# Patient Record
Sex: FEMALE | Race: WHITE | Employment: OTHER | URBAN - METROPOLITAN AREA
[De-identification: names, ages, dates, MRNs, and addresses within clinical notes are randomized per-mention and may not be internally consistent; named-entity substitution may affect disease eponyms.]

---

## 2017-01-17 PROBLEM — E78.5 DYSLIPIDEMIA (HIGH LDL; LOW HDL): Chronic | Status: ACTIVE | Noted: 2017-01-17

## 2017-01-17 PROBLEM — E55.9 HYPOVITAMINOSIS D: Chronic | Status: ACTIVE | Noted: 2017-01-17

## 2017-04-06 PROBLEM — E03.9 PRIMARY HYPOTHYROIDISM: Status: ACTIVE | Noted: 2017-04-06

## 2017-04-06 PROBLEM — E06.3 HASHIMOTO'S THYROIDITIS: Status: ACTIVE | Noted: 2017-04-06

## 2017-05-11 PROBLEM — Z79.890 HORMONE REPLACEMENT THERAPY (HRT): Status: ACTIVE | Noted: 2017-05-11

## 2017-05-11 PROBLEM — R53.82 CHRONIC FATIGUE: Chronic | Status: ACTIVE | Noted: 2017-05-11

## 2017-05-11 PROBLEM — E06.3 HYPOTHYROIDISM DUE TO HASHIMOTO'S THYROIDITIS: Chronic | Status: ACTIVE | Noted: 2017-05-11

## 2017-05-11 PROBLEM — E03.8 HYPOTHYROIDISM DUE TO HASHIMOTO'S THYROIDITIS: Chronic | Status: ACTIVE | Noted: 2017-05-11

## 2017-05-11 PROBLEM — E66.3 OVERWEIGHT (BMI 25.0-29.9): Chronic | Status: ACTIVE | Noted: 2017-05-11

## 2017-05-11 PROBLEM — R40.0 SOMNOLENCE, DAYTIME: Chronic | Status: ACTIVE | Noted: 2017-05-11

## 2017-11-08 ENCOUNTER — HOSPITAL ENCOUNTER (OUTPATIENT)
Dept: MAMMOGRAPHY | Age: 62
Discharge: HOME | End: 2017-11-08
Attending: OBSTETRICS & GYNECOLOGY
Payer: COMMERCIAL

## 2017-11-08 DIAGNOSIS — Z12.31 VISIT FOR SCREENING MAMMOGRAM: ICD-10-CM

## 2017-11-08 PROCEDURE — 77067 SCR MAMMO BI INCL CAD: CPT

## 2017-12-11 ENCOUNTER — HOSPITAL ENCOUNTER (OUTPATIENT)
Dept: MAMMOGRAPHY | Age: 62
Discharge: HOME | End: 2017-12-11
Attending: OBSTETRICS & GYNECOLOGY
Payer: COMMERCIAL

## 2017-12-11 DIAGNOSIS — Z82.62 FAM HX-OSTEOPOROSIS: ICD-10-CM

## 2017-12-11 PROCEDURE — 77080 DXA BONE DENSITY AXIAL: CPT

## 2018-04-18 PROBLEM — F32.A MILD DEPRESSION: Status: ACTIVE | Noted: 2018-04-18

## 2018-04-18 PROBLEM — F32.A MILD DEPRESSION: Chronic | Status: ACTIVE | Noted: 2018-04-18

## 2018-05-03 ENCOUNTER — HOSPITAL ENCOUNTER (OUTPATIENT)
Dept: MRI IMAGING | Age: 63
Discharge: HOME | End: 2018-05-03
Attending: INTERNAL MEDICINE
Payer: COMMERCIAL

## 2018-05-03 DIAGNOSIS — R42 VERTIGO: ICD-10-CM

## 2018-05-03 DIAGNOSIS — G89.29 CHRONIC NONINTRACTABLE HEADACHE, UNSPECIFIED HEADACHE TYPE: ICD-10-CM

## 2018-05-03 DIAGNOSIS — R51.9 CHRONIC NONINTRACTABLE HEADACHE, UNSPECIFIED HEADACHE TYPE: ICD-10-CM

## 2018-05-03 PROCEDURE — 70553 MRI BRAIN STEM W/O & W/DYE: CPT

## 2018-05-03 PROCEDURE — 74011250636 HC RX REV CODE- 250/636: Performed by: INTERNAL MEDICINE

## 2018-05-03 PROCEDURE — A9577 INJ MULTIHANCE: HCPCS | Performed by: INTERNAL MEDICINE

## 2018-05-03 RX ORDER — SODIUM CHLORIDE 0.9 % (FLUSH) 0.9 %
10 SYRINGE (ML) INJECTION
Status: COMPLETED | OUTPATIENT
Start: 2018-05-03 | End: 2018-05-03

## 2018-05-03 RX ADMIN — Medication 10 ML: at 19:03

## 2018-05-03 RX ADMIN — GADOBENATE DIMEGLUMINE 12 ML: 529 INJECTION, SOLUTION INTRAVENOUS at 19:03

## 2018-12-05 ENCOUNTER — HOSPITAL ENCOUNTER (OUTPATIENT)
Dept: MAMMOGRAPHY | Age: 63
Discharge: HOME | End: 2018-12-05
Attending: OBSTETRICS & GYNECOLOGY
Payer: COMMERCIAL

## 2018-12-05 DIAGNOSIS — Z12.39 SCREENING FOR BREAST CANCER: ICD-10-CM

## 2018-12-05 PROCEDURE — 77067 SCR MAMMO BI INCL CAD: CPT

## 2018-12-21 ENCOUNTER — HOSPITAL ENCOUNTER (EMERGENCY)
Age: 63
Discharge: HOME | End: 2018-12-22
Payer: COMMERCIAL

## 2018-12-21 ENCOUNTER — APPOINTMENT (OUTPATIENT)
Dept: GENERAL RADIOLOGY | Age: 63
End: 2018-12-21
Attending: EMERGENCY MEDICINE
Payer: COMMERCIAL

## 2018-12-21 DIAGNOSIS — S82.831A CLOSED FRACTURE OF DISTAL END OF RIGHT FIBULA, UNSPECIFIED FRACTURE MORPHOLOGY, INITIAL ENCOUNTER: Primary | ICD-10-CM

## 2018-12-21 PROCEDURE — 73610 X-RAY EXAM OF ANKLE: CPT

## 2018-12-21 PROCEDURE — 99283 EMERGENCY DEPT VISIT LOW MDM: CPT

## 2018-12-21 RX ORDER — HYDROCODONE BITARTRATE AND ACETAMINOPHEN 5; 325 MG/1; MG/1
1 TABLET ORAL
Qty: 10 TAB | Refills: 0 | Status: SHIPPED | OUTPATIENT
Start: 2018-12-21 | End: 2019-03-11

## 2018-12-22 VITALS
RESPIRATION RATE: 16 BRPM | SYSTOLIC BLOOD PRESSURE: 127 MMHG | TEMPERATURE: 98.2 F | OXYGEN SATURATION: 99 % | HEART RATE: 63 BPM | DIASTOLIC BLOOD PRESSURE: 79 MMHG

## 2018-12-22 NOTE — ED NOTES
I have reviewed discharge instructions with the patient. The patient verbalized understanding. Patient left ED via Discharge Method: wheelchair to Home with . Opportunity for questions and clarification provided. Patient given 0 scripts. To continue your aftercare when you leave the hospital, you may receive an automated call from our care team to check in on how you are doing. This is a free service and part of our promise to provide the best care and service to meet your aftercare needs.  If you have questions, or wish to unsubscribe from this service please call [phone]. Thank you for Choosing our [facility] Emergency Department.

## 2018-12-22 NOTE — ED PROVIDER NOTES
18-year-old female complaining of right ankle pain. This was at [facility] her foot tangled up in a cord and         Fall   Pertinent negatives include no visual change, no numbness, no abdominal pain, no loss of consciousness and no laceration. The symptoms are aggravated by activity. She has tried nothing for the symptoms. The patient's last tetanus shot was less than 5 years ago. Ankle Injury    Pertinent negatives include no numbness.         Past Medical History:   Diagnosis Date    Anxiety     Atypical facial pain     localized to rt maxill    Chronic sinusitis     Depression     Deviated nasal septum     Esophageal reflux 7/18/2016    Eustachian tube dysfunction     GERD (gastroesophageal reflux disease)     History of blood transfusion 1992    History of mycoplasma pneumonia 1998    Hx of chlamydia infection     treated 1994    Hx of Lyme disease 2002    Hypothyroidism due to Hashimoto's thyroiditis 5/11/2017    Migraine     Mild depression ([facility] [address] 4/18/2018    Nausea & vomiting     Other ill-defined conditions(799.89)     h/o osteomylitis of [facility]- still having problems    Thyroid disease 2004    Hashimoto's    Vaginitis and vulvovaginitis        Past Surgical History:   Procedure Laterality Date    BREAST SURGERY PROCEDURE UNLISTED  [id number]    left breast x3    BREAST SURGERY PROCEDURE UNLISTED  1992    reconstruction    HX BREAST BIOPSY Left 1992, 1999, 2002    benign mass    HX BREAST RECONSTRUCTION Bilateral     HX BREAST REDUCTION Bilateral 1992    HX COLONOSCOPY  2013    HX GYN  1981    Tubal Ligation    HX HEENT  1964    tonsilectomy    HX HEENT      jaw surg r/t osteomylitits-- 2004    HX HEENT  4/13/05    SEPTO/FESS    HX OTHER SURGICAL Bilateral 06/7/05    Tube Placement    HX REFRACTIVE SURGERY  2001    HX NANCY AND BSO  1999    Hysterectomy & BSO         Family History:   Problem Relation Age of Onset    Post-op Nausea/Vomiting Mother    [facility] Other Mother  Hypertension Mother    [facility] Diabetes Father     Hypertension Father     Other Father         bladder cancer, parkinson's    Heart Disease Father     Cancer Father         bladder    Parkinson's Disease Father     Glaucoma Father    [facility] Stroke Sister     Hypertension Sister     Stroke Paternal Grandfather     No Known Problems Brother     Hypertension Daughter     Colon Cancer Maternal Aunt     Diabetes Maternal Uncle     Breast Cancer Maternal Grandmother     Hypertension Paternal Grandmother     Breast Cancer Paternal Aunt     Malignant Hyperthermia Neg Hx     Pseudocholinesterase Deficiency Neg Hx     Delayed Awakening Neg Hx     Emergence Delirium Neg Hx     Post-op Cognitive Dysfunction Neg Hx        Social History     Socioeconomic History    Marital status:      Spouse name: Not on file    Number of children: Not on file    Years of education: Not on file    Highest education level: Not on file   Social Needs    Financial resource strain: Not on file    Food insecurity - worry: Not on file    Food insecurity - inability: Not on file   [employer] needs - medical: Not on file   [employer] needs - non-medical: Not on file   Occupational History    Not on file   Tobacco Use    Smoking status: Never Smoker    Smokeless tobacco: Never Used   Substance and Sexual Activity    Alcohol use: No    Drug use: No    Sexual activity: Yes     Partners: Male     Birth control/protection: Surgical   Other Topics Concern    Not on file   Social History Narrative    Not on file         ALLERGIES: Latex; Adhesive tape; Lortab [hydrocodone-acetaminophen]; Minocycline; and Other plant, animal, environmental    Review of Systems   Constitutional: Negative. Negative for activity change. HENT: Negative. Eyes: Negative. Respiratory: Negative. Cardiovascular: Negative. Gastrointestinal: Negative. Negative for abdominal pain. Genitourinary: Negative. Musculoskeletal: Negative. Skin: Negative. Neurological: Negative. Negative for loss of consciousness and numbness. Psychiatric/Behavioral: Negative. All other systems reviewed and are negative. Vitals:    12/21/18 2056   BP: 140/74   Pulse: (!) 59   Resp: 16   Temp: 98 °F (36.7 °C)   SpO2: 99%            Physical Exam   Constitutional: She is oriented to person, place, and time. She appears well-developed and well-nourished. No distress. HENT:   Head: Normocephalic and atraumatic. Right Ear: External ear normal.   Left Ear: External ear normal.   Nose: Nose normal.   Eyes: Conjunctivae and EOM are normal. Pupils are equal, round, and reactive to light. Right eye exhibits no discharge. Left eye exhibits no discharge. No scleral icterus. Neck: Normal range of motion. Cardiovascular: Regular rhythm. Pulmonary/Chest: Effort normal and breath sounds normal. No stridor. No respiratory distress. She has no wheezes. She has no rales. Abdominal: Soft. Bowel sounds are normal. She exhibits no distension. There is no tenderness. Musculoskeletal: Normal range of motion. Right ankle: She exhibits swelling. Tenderness. Neurological: She is alert and oriented to person, place, and time. She exhibits normal muscle tone. Coordination normal.   Skin: Skin is warm and dry. No laceration and no rash noted. Psychiatric: She has a normal mood and affect.  Her behavior is normal.        MDM  Number of Diagnoses or Management Options  Diagnosis management comments: Assessment nondisplaced right distal fibular fracture       Amount and/or Complexity of Data Reviewed  Tests in the radiology section of CPT®: ordered and reviewed           Procedures

## 2018-12-22 NOTE — DISCHARGE INSTRUCTIONS
Broken Ankle: Care Instructions  Your Care Instructions    An ankle may break (fracture) during sports, a fall, or other accidents. Fractures can range from a small, hairline crack, to a bone or bones broken into two or more pieces. Your treatment depends on how bad the break is. Your doctor may have put your ankle in a splint or cast to allow it to heal or to keep it stable until you see another doctor. It may take weeks or months for your ankle to heal. You can help your ankle heal with some care at home. You heal best when you take good care of yourself. Eat a variety of healthy foods, and don't smoke. You may have had a sedative to help you relax. You may be unsteady after having sedation. It can take a few hours for the medicine's effects to wear off. Common side effects of sedation include nausea, vomiting, and feeling sleepy or tired. The doctor has checked you carefully, but problems can develop later. If you notice any problems or new symptoms,  get medical treatment right away. Follow-up care is a key part of your treatment and safety. Be sure to make and go to all appointments, and call your doctor if you are having problems. It's also a good idea to know your test results and keep a list of the medicines you take. How can you care for yourself at home? · If the doctor gave you a sedative:  ? For 24 hours, don't do anything that requires attention to detail. It takes time for the medicine's effects to completely wear off.  ? For your safety, do not drive or operate any machinery that could be dangerous. Wait until the medicine wears off and you can think clearly and react easily. · Put ice or a cold pack on your ankle for 10 to 20 minutes at a time. Try to do this every 1 to 2 hours for the next 3 days (when you are awake). Put a thin cloth between the ice and your cast or splint. Keep your cast or splint dry. · Follow the cast care instructions your doctor gives you.  If you have a splint, do not take it off unless your doctor tells you to. · Be safe with medicines. Take pain medicines exactly as directed. ? If the doctor gave you a prescription medicine for pain, take it as prescribed. ? If you are not taking a prescription pain medicine, ask your doctor if you can take an over-the-counter medicine. · Prop up your leg on pillows in the first few days after the injury. Keep the ankle higher than the level of your heart. This will help reduce swelling. · Do not put weight on your ankle unless your doctor tells you to. Use crutches to walk. · Follow instructions for exercises to keep your leg strong. · Wiggle your toes often to reduce swelling and stiffness. When should you call for help? Call 911 anytime you think you may need emergency care. For example, call if:    · You have chest pain, are short of breath, or you cough up blood.     · You are very sleepy and you have trouble waking up.    Call your doctor now or seek immediate medical care if:    · You have new or worse nausea or vomiting.     · You have new or worse pain.     · Your foot is cool or pale or changes color.     · You have tingling, weakness, or numbness in your toes.     · Your cast or splint feels too tight.     · You have signs of a blood clot in your leg (called a deep vein thrombosis), such as:  ? Pain in your calf, back of the knee, thigh, or groin. ? Redness or swelling in your leg.    Watch closely for changes in your health, and be sure to contact your doctor if:    · You have a problem with your splint or cast.     · You do not get better as expected. Where can you learn more? Go to http://dm-viri.info/. Enter P763 in the search box to learn more about \"Broken Ankle: Care Instructions. \"  Current as of: November 29, 2017  Content Version: 11.8  © 3634-2880 [employer].  Care instructions adapted under license by [employer] (which disclaims liability or warranty for this information). If you have questions about a medical condition or this instruction, always ask your healthcare professional. [place] [address] any warranty or liability for your use of this information.

## 2020-01-20 ENCOUNTER — HOSPITAL ENCOUNTER (OUTPATIENT)
Dept: MAMMOGRAPHY | Age: 65
Discharge: HOME | End: 2020-01-20
Attending: OBSTETRICS & GYNECOLOGY
Payer: MEDICARE

## 2020-01-20 DIAGNOSIS — Z12.31 VISIT FOR SCREENING MAMMOGRAM: ICD-10-CM

## 2020-01-20 PROCEDURE — 77067 SCR MAMMO BI INCL CAD: CPT

## 2020-07-15 ENCOUNTER — HOSPITAL ENCOUNTER (OUTPATIENT)
Dept: MAMMOGRAPHY | Age: 65
Discharge: HOME | End: 2020-07-15
Payer: MEDICARE

## 2020-07-15 DIAGNOSIS — M85.89 OSTEOPENIA OF MULTIPLE SITES: ICD-10-CM

## 2020-07-15 PROCEDURE — 77080 DXA BONE DENSITY AXIAL: CPT

## 2021-01-22 ENCOUNTER — HOSPITAL ENCOUNTER (OUTPATIENT)
Dept: MAMMOGRAPHY | Age: 66
Discharge: HOME | End: 2021-01-22
Attending: OBSTETRICS & GYNECOLOGY
Payer: MEDICARE

## 2021-01-22 DIAGNOSIS — N64.4 BREAST PAIN, LEFT: ICD-10-CM

## 2021-01-22 PROCEDURE — 77066 DX MAMMO INCL CAD BI: CPT

## 2021-02-11 PROBLEM — N39.3 STRESS INCONTINENCE: Status: ACTIVE | Noted: 2021-02-11

## 2021-02-11 PROBLEM — N39.41 URGE INCONTINENCE: Status: ACTIVE | Noted: 2021-02-11

## 2021-02-23 ENCOUNTER — HOSPITAL ENCOUNTER (OUTPATIENT)
Dept: SURGERY | Age: 66
Discharge: HOME | End: 2021-02-23

## 2021-02-26 VITALS — BODY MASS INDEX: 25.87 KG/M2 | WEIGHT: 146 LBS | HEIGHT: 63 IN

## 2021-02-26 NOTE — H&P
History and Physical    Patient: Angi Capellan MRN: [id number]  SSN: [id number]    YOB: 1955  Age: 76 y.o. Sex: female        Chief Complaint:  Stress Incontinence  History of Present Illness:  Angi Capellan is a 76 y.o. female PAPITO. Ms. Angi Capellan has been experiencing prolapse for 1 year. The prolapse does cause her pressure. She does not have to splint to complete urination, a BM, or for comfort. Nothing makes her prolapse symptoms worse. Nothing makes her prolapse symptoms better. She has not tried a pessary, she has tried physical therapy, she has not  had surgery for her POP. Ms. Angi Capellan has been leaking urine for 1 year. She leaks urine both daytime and night time. She does leak urine with cough, laugh, sneeze and activity. She does leak urine with urgency. She uses medium pads and goes through 3 or more per day. She changes for both wetness and hygiene. She leaks numerous times per day. When she leaks she leaks both small and large amounts. She has  tried PT, she has not  tried medication. She has not  had procedures/surgery for this in the past.   She does not feel like she completely empties. PAPITO>UUI       She voids 10-12 times during the day. She voids 2 times over night. She has 7-14 BM per week, and does strain sometimes. She drinks 2 caffeine drinks beverages per day. Coffee  She uses 0 artificial sweeteners per day. She drinks 0 alcoholic beverages per week. She has  pelvic surgery in the past. [address] and BSO 2000  Her last PAP: 2020  No results found for: [id number]  Her last Colonoscopy: 11/17/17  Her last Mammogram: 1/12/21    She does not have a history of DM. No results found for: HBA1C, MGE0PMMY, SBO2PDBT  She does not have a history of sleep apnea. Tobacco: No    Sexual History: has sex with males. Her .     Allergies:  Latex  Adhesive Tape  Lortab [Hydrocodone-Acetaminophen]  Minocycline  Other Plant, Animal, Environmental    Medications:  No current facility-administered medications for this encounter. Current Outpatient Medications:     fluticasone propionate (FLONASE) 50 mcg/actuation nasal spray, 2 Sprays by Both Nostrils route daily. , Disp: 1 Bottle, Rfl: 3    LORazepam (ATIVAN) 0.5 mg tablet, Take 1 Tab by mouth every six (6) hours as needed for Anxiety. Max Daily Amount: 2 mg. Do not drive while on this medication. Indications: anxious, Disp: 60 Tab, Rfl: 1    levothyroxine (SYNTHROID) 50 mcg tablet, Take 1 Tab by mouth Daily (before breakfast). Indications: a condition with low thyroid hormone levels, Disp: 90 Tab, Rfl: 3    escitalopram oxalate (LEXAPRO) 20 mg tablet, Take 1 Tab by mouth nightly. Indications: anxiousness associated with depression, Disp: 90 Tab, Rfl: 3    betamethasone valerate (VALISONE) 0.1 % topical lotion, Apply  to affected area daily. , Disp: 60 mL, Rfl: 3    Cholecalciferol, Vitamin D3, 50,000 unit cap, Take 1 Cap by mouth every seven (7) days. , Disp: , Rfl:     MULTIVITAMINS W-MINERALS (MULTI-VIT 55 PLUS PO), Take 1 Tab by mouth every morning.  Stopped 3/3/10, Disp: , Rfl:       Past Medical History:  Past Medical History:   Diagnosis Date    Anxiety     Atypical facial pain     localized to rt maxill    Chronic sinusitis     Depression     Deviated nasal septum     Esophageal reflux 7/18/2016    Eustachian tube dysfunction     GERD (gastroesophageal reflux disease)     History of blood transfusion 1992    History of mycoplasma pneumonia 1998    History of osteomyelitis     h/o osteomylitis of [id number]- still having problems    History of shingles 2020    Hx of chlamydia infection     treated 1994    Hx of Lyme disease 2002    Hypothyroidism due to Hashimoto's thyroiditis 5/11/2017    Migraine     Mild depression ([address] 4/18/2018    Nausea & vomiting     Thyroid disease 2004    Hashimoto's    Vaginitis and vulvovaginitis        Past Surgical History:  Past Surgical History:   Procedure Laterality Date   • HX BREAST BIOPSY Left 1992, 1999, 2002    benign mass   • HX BREAST RECONSTRUCTION Bilateral    • HX BREAST REDUCTION Bilateral 1992   • HX COLONOSCOPY  2013   • HX GYN  1981    Tubal Ligation   • HX HEENT  1964    tonsilectomy   • HX HEENT      jaw surg r/t osteomylitits-- 2004   • HX HEENT  4/13/05    SEPTO/FESS   • HX OTHER SURGICAL Bilateral 06/7/05    Tube Placement   • HX REFRACTIVE SURGERY  2001   • HX NANCY AND BSO  1999    Hysterectomy & BSO   • [place] BREAST SURGERY PROCEDURE UNLISTED  1992,1999,2002    left breast x3   • [place] BREAST SURGERY PROCEDURE UNLISTED  1992    reconstruction       Family History:  Family History   Problem Relation Age of Onset   • Post-op Nausea/Vomiting Mother    • Other Mother    • Hypertension Mother    • Diabetes Father    • Hypertension Father    • Other Father         bladder cancer, parkinson's   • Heart Disease Father    • Cancer Father         bladder   • Parkinson's Disease Father    • Glaucoma Father    • Stroke Sister    • Hypertension Sister    • Stroke Paternal Grandfather    • No Known Problems Brother    • Hypertension Daughter    • Colon Cancer Maternal Aunt    • Diabetes Maternal Uncle    • Breast Cancer Maternal Grandmother    • Hypertension Paternal Grandmother    • Breast Cancer Paternal Aunt    • Malignant Hyperthermia Neg Hx    • Pseudocholinesterase Deficiency Neg Hx    • Delayed Awakening Neg Hx    • Emergence Delirium Neg Hx    • Post-op Cognitive Dysfunction Neg Hx          Social History:  Social History     Tobacco Use   Smoking Status Never Smoker   Smokeless Tobacco Never Used     Social History     Substance and Sexual Activity   Alcohol Use No     Social History     Substance and Sexual Activity   Drug Use No     Physical Exam:  PVR by straight catheterization: 60cc    Physical Exam    Female Genitourinary   Vulva:    Normal. No lesions  Bartholin's Gland:  Bilateral , Normal, nontender  Skenes Gland:  Bilateral, Normal, nontender   Clitoris:  Normal.   Introitus:    Normal.   Urethral Meatus:  Normal appearing, normal size, no lesions, no prolapse  Urethra:  No masses, no tenderness  Vagina:  No atrophy, no discharge, no lesions  Cervix:  Absent  Uterus:  Absent   Adnexa:   No masses palpated, no tenderness  Bladder:  No tenderness, no masses palpated  Perineum:  Normal, no lesions    Rectal   Anorectal Exam: No hemorrhoids and no masses or lesions of the perineum        POP-Q: (Pelvic Organ Prolapse - Quantification Exam):    -3 Aa -3 Ba -9.5 C   2 gh 4 pb 10 tvl   -3 Ap -3 Bp X D     ICS Stage: 0      Pelvic floor muscles: Tender Spasm     R. Puborectalis: NO 0 /5    L. Puborectalis: NO 0 /5    R. Pubococcyg NO 0 /5    L. Pubococcyg NO 0 /5    R. Ileococcyg: NO 0 /5    L. Ileococcyg: NO 0 /5    R. Obturator Int: NO 0 /5    L. Obturator Int: NO 0 /5    R. Coccygeus: NO 0 /5    L. Coccygeus: NO 0 /5      Pelvic floor contractions: 0/5    Supine Stress Test of PAPITO: Negative    Neurological Exam:   Sensorineural Exam:    Bulvocavernosus reflex:  Normal   Anal [place]:  Normal        Assessment and Plan:  Stress incontinence  Assessment & Plan:  We discussed the differential diagnosis of urinary incontinence. We also discussed the pathogenesis and etiology of stress urinary incontinence. I explained the epidemiology of incontinence. I offered her options which include nothing, physical therapy, barrier treatment, and surgery. She is going to decrease her coffee    Stress Incontinence  We discussed the differential diagnosis of urinary incontinence. We also discussed the pathogenesis and etiology of stress urinary incontinence. I explained the epidemiology of incontinence. I offered her options which include nothing, physical therapy, barrier treatment, and surgery. She is interested in surgical treatment. I described the surgical technique to be employed for her upcoming surgery.  We discussed the anticipated postoperative course. TVT cure rates at 6 years is up to 85%. We discussed the properties of polypropylene mesh. We discussed the inert nature and the potential for complication, including infections, rejection, and erosion. The risk of erosion is <10%. We discussed the FDA warning on mesh use. Risks, benefits, indications, and alternatives of the surgery were discussed. Risks reviewed include bleeding, infections, injury to pelvic organs (bladder, nerves, vessels, urethra, and ureters), recurrence, urinary retention, dyspareunia, anesthetic complications, and death. We discussed that other complications could arise and that the list is too long to discuss comprehensively.       She is consented for a midurethral sling and cystoscopy        Signed By: London Loving DO     February 26, 2021

## 2021-02-26 NOTE — PERIOP NOTES
Patient verified name and . Order for consent  found in EHR and matches case posting; patient verifies procedure. Type 1b surgery, phone assessment complete. Orders  received. Labs per surgeon: none  Labs per anesthesia protocol: none    Patient COVID test date 21; Patient did  show for the appointment. [facility] [address] [place]. If appointment is needed-patient provided telephone number of [phone]. Patient answered medical/surgical history questions at their best of ability. All prior to admission medications documented in [facility]. Patient instructed to take the following medications the day of surgery according to anesthesia guidelines with a small sip of water:Levothyroxine . Hold all vitamins 7 days prior to surgery and NSAIDS 5 days prior to surgery. Prescription meds to hold: none    Patient instructed on the following:      > 1 medical  is allowed at this time. > Arrive at [facility], time of arrival to be called the day before by 1700  > NPO after midnight including gum, mints, and ice chips  > Responsible adult must drive patient to the hospital, stay during surgery, and patient will need supervision 24 hours after anesthesia  > Use antibacterial soap in shower the night before surgery and on the morning of surgery  > All piercings must be removed prior to arrival.    > Leave all valuables (money and jewelry) at home but bring insurance card and ID on DOS.   > Do not wear make-up, nail polish, lotions, cologne, perfumes, powders, or oil on skin.

## 2021-03-01 ENCOUNTER — ANESTHESIA EVENT (OUTPATIENT)
Dept: SURGERY | Age: 66
End: 2021-03-01
Payer: MEDICARE

## 2021-03-02 ENCOUNTER — ANESTHESIA (OUTPATIENT)
Dept: SURGERY | Age: 66
End: 2021-03-02
Payer: MEDICARE

## 2021-03-02 ENCOUNTER — HOSPITAL ENCOUNTER (OUTPATIENT)
Age: 66
Setting detail: OUTPATIENT SURGERY
Discharge: HOME | End: 2021-03-02
Attending: OBSTETRICS & GYNECOLOGY | Admitting: OBSTETRICS & GYNECOLOGY
Payer: MEDICARE

## 2021-03-02 VITALS
BODY MASS INDEX: 26.28 KG/M2 | TEMPERATURE: 98.7 F | RESPIRATION RATE: 16 BRPM | WEIGHT: 146 LBS | HEART RATE: 84 BPM | DIASTOLIC BLOOD PRESSURE: 62 MMHG | SYSTOLIC BLOOD PRESSURE: 126 MMHG | OXYGEN SATURATION: 96 %

## 2021-03-02 DIAGNOSIS — G89.18 POSTOPERATIVE PAIN: Primary | ICD-10-CM

## 2021-03-02 DIAGNOSIS — N39.41 URGE INCONTINENCE: ICD-10-CM

## 2021-03-02 DIAGNOSIS — N39.3 STRESS INCONTINENCE: ICD-10-CM

## 2021-03-02 PROCEDURE — 77030019927 HC TBNG IRR CYSTO BAXT -A: Performed by: OBSTETRICS & GYNECOLOGY

## 2021-03-02 PROCEDURE — 74011250636 HC RX REV CODE- 250/636: Performed by: NURSE ANESTHETIST, CERTIFIED REGISTERED

## 2021-03-02 PROCEDURE — 77030010509 HC AIRWY LMA MSK TELE -A: Performed by: NURSE ANESTHETIST, CERTIFIED REGISTERED

## 2021-03-02 PROCEDURE — 76210000006 HC OR PH I REC 0.5 TO 1 HR: Performed by: OBSTETRICS & GYNECOLOGY

## 2021-03-02 PROCEDURE — 74011250637 HC RX REV CODE- 250/637: Performed by: ANESTHESIOLOGY

## 2021-03-02 PROCEDURE — 77030031139 HC SUT VCRL2 J&J -A: Performed by: OBSTETRICS & GYNECOLOGY

## 2021-03-02 PROCEDURE — 74011250636 HC RX REV CODE- 250/636: Performed by: ANESTHESIOLOGY

## 2021-03-02 PROCEDURE — 76060000032 HC ANESTHESIA 0.5 TO 1 HR: Performed by: OBSTETRICS & GYNECOLOGY

## 2021-03-02 PROCEDURE — 77030040830 HC CATH URETH FOL MDII -A: Performed by: OBSTETRICS & GYNECOLOGY

## 2021-03-02 PROCEDURE — 76010000138 HC OR TIME 0.5 TO 1 HR: Performed by: OBSTETRICS & GYNECOLOGY

## 2021-03-02 PROCEDURE — 52224 CYSTOSCOPY AND TREATMENT: CPT | Performed by: OBSTETRICS & GYNECOLOGY

## 2021-03-02 PROCEDURE — 57288 REPAIR BLADDER DEFECT: CPT | Performed by: OBSTETRICS & GYNECOLOGY

## 2021-03-02 PROCEDURE — 74011000250 HC RX REV CODE- 250: Performed by: NURSE ANESTHETIST, CERTIFIED REGISTERED

## 2021-03-02 PROCEDURE — 76210000021 HC REC RM PH II 0.5 TO 1 HR: Performed by: OBSTETRICS & GYNECOLOGY

## 2021-03-02 PROCEDURE — 74011000250 HC RX REV CODE- 250: Performed by: OBSTETRICS & GYNECOLOGY

## 2021-03-02 PROCEDURE — 74011250636 HC RX REV CODE- 250/636: Performed by: OBSTETRICS & GYNECOLOGY

## 2021-03-02 PROCEDURE — 77030034696 HC CATH URETH FOL 2W BARD -A: Performed by: OBSTETRICS & GYNECOLOGY

## 2021-03-02 PROCEDURE — 77030008462 HC STPLR SKN PROX J&J -A: Performed by: OBSTETRICS & GYNECOLOGY

## 2021-03-02 PROCEDURE — 88307 TISSUE EXAM BY PATHOLOGIST: CPT

## 2021-03-02 PROCEDURE — 2709999900 HC NON-CHARGEABLE SUPPLY: Performed by: OBSTETRICS & GYNECOLOGY

## 2021-03-02 PROCEDURE — 77030010507 HC ADH SKN DERMBND J&J -B: Performed by: OBSTETRICS & GYNECOLOGY

## 2021-03-02 PROCEDURE — C1771 REP DEV, URINARY, W/SLING: HCPCS | Performed by: OBSTETRICS & GYNECOLOGY

## 2021-03-02 DEVICE — TRANSVAGINAL MID-URETHRAL SLING
Type: IMPLANTABLE DEVICE | Site: URETHRA | Status: FUNCTIONAL
Brand: ADVANTAGE™ SYSTEM

## 2021-03-02 RX ORDER — CEFAZOLIN SODIUM/WATER 2 G/20 ML
2 SYRINGE (ML) INTRAVENOUS ONCE
Status: COMPLETED | OUTPATIENT
Start: 2021-03-02 | End: 2021-03-02

## 2021-03-02 RX ORDER — OXYCODONE AND ACETAMINOPHEN 5; 325 MG/1; MG/1
1 TABLET ORAL AS NEEDED
Status: DISCONTINUED | OUTPATIENT
Start: 2021-03-02 | End: 2021-03-02 | Stop reason: HOSPADM

## 2021-03-02 RX ORDER — OXYCODONE HYDROCHLORIDE 5 MG/1
5 TABLET ORAL
Status: DISCONTINUED | OUTPATIENT
Start: 2021-03-02 | End: 2021-03-02 | Stop reason: HOSPADM

## 2021-03-02 RX ORDER — DEXAMETHASONE SODIUM PHOSPHATE 4 MG/ML
INJECTION, SOLUTION INTRA-ARTICULAR; INTRALESIONAL; INTRAMUSCULAR; INTRAVENOUS; SOFT TISSUE AS NEEDED
Status: DISCONTINUED | OUTPATIENT
Start: 2021-03-02 | End: 2021-03-02 | Stop reason: HOSPADM

## 2021-03-02 RX ORDER — FENTANYL CITRATE 50 UG/ML
INJECTION, SOLUTION INTRAMUSCULAR; INTRAVENOUS AS NEEDED
Status: DISCONTINUED | OUTPATIENT
Start: 2021-03-02 | End: 2021-03-02 | Stop reason: HOSPADM

## 2021-03-02 RX ORDER — LIDOCAINE HYDROCHLORIDE 20 MG/ML
INJECTION, SOLUTION EPIDURAL; INFILTRATION; INTRACAUDAL; PERINEURAL AS NEEDED
Status: DISCONTINUED | OUTPATIENT
Start: 2021-03-02 | End: 2021-03-02 | Stop reason: HOSPADM

## 2021-03-02 RX ORDER — LIDOCAINE HYDROCHLORIDE 10 MG/ML
0.1 INJECTION INFILTRATION; PERINEURAL AS NEEDED
Status: DISCONTINUED | OUTPATIENT
Start: 2021-03-02 | End: 2021-03-02 | Stop reason: HOSPADM

## 2021-03-02 RX ORDER — SODIUM CHLORIDE 0.9 % (FLUSH) 0.9 %
5-40 SYRINGE (ML) INJECTION AS NEEDED
Status: DISCONTINUED | OUTPATIENT
Start: 2021-03-02 | End: 2021-03-02 | Stop reason: HOSPADM

## 2021-03-02 RX ORDER — SODIUM CHLORIDE 0.9 % (FLUSH) 0.9 %
5-40 SYRINGE (ML) INJECTION EVERY 8 HOURS
Status: DISCONTINUED | OUTPATIENT
Start: 2021-03-02 | End: 2021-03-02 | Stop reason: HOSPADM

## 2021-03-02 RX ORDER — LIDOCAINE HYDROCHLORIDE AND EPINEPHRINE 10; 10 MG/ML; UG/ML
INJECTION, SOLUTION INFILTRATION; PERINEURAL AS NEEDED
Status: DISCONTINUED | OUTPATIENT
Start: 2021-03-02 | End: 2021-03-02 | Stop reason: HOSPADM

## 2021-03-02 RX ORDER — DIPHENHYDRAMINE HYDROCHLORIDE 50 MG/ML
12.5 INJECTION, SOLUTION INTRAMUSCULAR; INTRAVENOUS
Status: DISCONTINUED | OUTPATIENT
Start: 2021-03-02 | End: 2021-03-02 | Stop reason: HOSPADM

## 2021-03-02 RX ORDER — FENTANYL CITRATE 50 UG/ML
25 INJECTION, SOLUTION INTRAMUSCULAR; INTRAVENOUS ONCE
Status: DISCONTINUED | OUTPATIENT
Start: 2021-03-02 | End: 2021-03-02 | Stop reason: HOSPADM

## 2021-03-02 RX ORDER — HYDROMORPHONE HYDROCHLORIDE 2 MG/1
2 TABLET ORAL
Qty: 10 TAB | Refills: 0 | Status: SHIPPED | OUTPATIENT
Start: 2021-03-02 | End: 2021-03-05

## 2021-03-02 RX ORDER — FAMOTIDINE 20 MG/1
20 TABLET, FILM COATED ORAL ONCE
Status: COMPLETED | OUTPATIENT
Start: 2021-03-02 | End: 2021-03-02

## 2021-03-02 RX ORDER — PROPOFOL 10 MG/ML
INJECTION, EMULSION INTRAVENOUS AS NEEDED
Status: DISCONTINUED | OUTPATIENT
Start: 2021-03-02 | End: 2021-03-02 | Stop reason: HOSPADM

## 2021-03-02 RX ORDER — ONDANSETRON 2 MG/ML
INJECTION INTRAMUSCULAR; INTRAVENOUS AS NEEDED
Status: DISCONTINUED | OUTPATIENT
Start: 2021-03-02 | End: 2021-03-02 | Stop reason: HOSPADM

## 2021-03-02 RX ORDER — HYDROMORPHONE HYDROCHLORIDE 2 MG/ML
0.5 INJECTION, SOLUTION INTRAMUSCULAR; INTRAVENOUS; SUBCUTANEOUS
Status: DISCONTINUED | OUTPATIENT
Start: 2021-03-02 | End: 2021-03-02 | Stop reason: HOSPADM

## 2021-03-02 RX ORDER — SODIUM CHLORIDE 9 MG/ML
50 INJECTION, SOLUTION INTRAVENOUS CONTINUOUS
Status: DISCONTINUED | OUTPATIENT
Start: 2021-03-02 | End: 2021-03-02 | Stop reason: HOSPADM

## 2021-03-02 RX ORDER — MIDAZOLAM HYDROCHLORIDE 1 MG/ML
2 INJECTION, SOLUTION INTRAMUSCULAR; INTRAVENOUS ONCE
Status: COMPLETED | OUTPATIENT
Start: 2021-03-02 | End: 2021-03-02

## 2021-03-02 RX ORDER — SODIUM CHLORIDE, SODIUM LACTATE, POTASSIUM CHLORIDE, CALCIUM CHLORIDE 600; 310; 30; 20 MG/100ML; MG/100ML; MG/100ML; MG/100ML
75 INJECTION, SOLUTION INTRAVENOUS CONTINUOUS
Status: DISCONTINUED | OUTPATIENT
Start: 2021-03-02 | End: 2021-03-02 | Stop reason: HOSPADM

## 2021-03-02 RX ORDER — MIDAZOLAM HYDROCHLORIDE 1 MG/ML
2 INJECTION, SOLUTION INTRAMUSCULAR; INTRAVENOUS
Status: DISCONTINUED | OUTPATIENT
Start: 2021-03-02 | End: 2021-03-02 | Stop reason: HOSPADM

## 2021-03-02 RX ORDER — EPHEDRINE SULFATE/0.9% NACL/PF 50 MG/5 ML
SYRINGE (ML) INTRAVENOUS AS NEEDED
Status: DISCONTINUED | OUTPATIENT
Start: 2021-03-02 | End: 2021-03-02 | Stop reason: HOSPADM

## 2021-03-02 RX ORDER — SODIUM CHLORIDE, SODIUM LACTATE, POTASSIUM CHLORIDE, CALCIUM CHLORIDE 600; 310; 30; 20 MG/100ML; MG/100ML; MG/100ML; MG/100ML
100 INJECTION, SOLUTION INTRAVENOUS CONTINUOUS
Status: DISCONTINUED | OUTPATIENT
Start: 2021-03-02 | End: 2021-03-02 | Stop reason: HOSPADM

## 2021-03-02 RX ADMIN — LIDOCAINE HYDROCHLORIDE 100 MG: 20 INJECTION, SOLUTION EPIDURAL; INFILTRATION; INTRACAUDAL; PERINEURAL at 10:34

## 2021-03-02 RX ADMIN — SODIUM CHLORIDE, SODIUM LACTATE, POTASSIUM CHLORIDE, AND CALCIUM CHLORIDE: 600; 310; 30; 20 INJECTION, SOLUTION INTRAVENOUS at 10:28

## 2021-03-02 RX ADMIN — MIDAZOLAM 2 MG: 1 INJECTION INTRAMUSCULAR; INTRAVENOUS at 10:19

## 2021-03-02 RX ADMIN — DEXAMETHASONE SODIUM PHOSPHATE 10 MG: 4 INJECTION, SOLUTION INTRAMUSCULAR; INTRAVENOUS at 10:47

## 2021-03-02 RX ADMIN — SODIUM CHLORIDE, SODIUM LACTATE, POTASSIUM CHLORIDE, AND CALCIUM CHLORIDE 1000 ML: 600; 310; 30; 20 INJECTION, SOLUTION INTRAVENOUS at 10:15

## 2021-03-02 RX ADMIN — ONDANSETRON 4 MG: 2 INJECTION INTRAMUSCULAR; INTRAVENOUS at 10:48

## 2021-03-02 RX ADMIN — FENTANYL CITRATE 50 MCG: 50 INJECTION INTRAMUSCULAR; INTRAVENOUS at 10:34

## 2021-03-02 RX ADMIN — FAMOTIDINE 20 MG: 20 TABLET, FILM COATED ORAL at 10:14

## 2021-03-02 RX ADMIN — Medication 10 MG: at 10:41

## 2021-03-02 RX ADMIN — CEFAZOLIN 2 G: 1 INJECTION, POWDER, FOR SOLUTION INTRAVENOUS at 10:41

## 2021-03-02 RX ADMIN — PROPOFOL 200 MG: 10 INJECTION, EMULSION INTRAVENOUS at 10:34

## 2021-03-02 NOTE — OP NOTES
PROCEDURES PERFORMED:  1. Mid urethral sling ([employer] Advantage Sling). 2. Cystourethroscopy with bladder wall biopsy x2 with fulgeration    PREOPERATIVE DIAGNOSES:  1. Stress urinary incontinence. POSTOPERATIVE DIAGNOSES:  1. Stress urinary incontinence. 2. Bladder inflammation      Addy Mckinley MD    EBL: 20cc    IVF: 1000cc    Urine Output: n/a    Specimen: Bladder wall bx x2    INTRAOPERATIVE FINDINGS: Intraoperative cystourethroscopy revealed normal bladder with two cystic lesions (one near the right UO and one near the left uo) and urethral mucosa without evidence of laceration, foreign body, neoplasm, or stone. Both ureters were effluxing urine at the conclusion of the case. INDICATIONS FOR PROCEDURE: This is a 68-year-old female with a history of worsening symptomatic  stress urinary incontinence who desired definitive surgical treatment after being extensively counseled on the risks, benefits, indications and alternatives of the procedure. Risks reviewed include bleeding, infection, damage to the bladder, ureters, urethra, bowel, blood vessels, nerves, postoperative urinary retention, postoperative incontinence, pelvic pain, dyspareunia, recurrence, mesh erosion, anesthetic complications and death. The patient expressed understanding and informed consent was obtained. DESCRIPTION OF PROCEDURE: On the day of surgery, the patient was identified in the preop waiting area. Consents were again reviewed. The patient was then taken to the operating room where she was placed in dorsal lithotomy position using the Yellofin stirrups in neurologically safe position. Antithrombotic boots were activated. General anesthesia was induced without difficulty. She was prepped and draped in usual sterile fashion. Timeout was taken to review the patient's procedure and confirm she received appropriate antibiotics. Morrow catheter was placed in the bladder to drain it.      At this point, we performed the mid urethral sling as follows: The anterior vaginal mucosa overlying the mid urethral region was superficially injected with 1% lidocaine, 1:100,000 epinephrine. A vertical midline incision was made approximately 2 cm in length with a scalpel. Metzenbaum scissors were then used to dissect the vaginal mucosa off the underlying fascia and bladder out underneath the pubic symphysis bilaterally. TVT trocar was then placed through the vaginal tunnel underneath the pubic symphysis through the retropubic space and out through the anterior abdominal wall just above the pubic symphysis. This process was then repeated on the contralateral side following the same anatomic principles. Morrow catheter was removed. Cystourethroscopy was then performed with the above noted findings. Two biopsies were taken of areas if concern. The areas were fulgurated for hemostasis. The scope was then removed. The catheter was replaced. Trocars and TVT polypropylene tape were advanced out through the retropubic space and through the abdominal skin incisions. Placement was confirmed to be in the midurethral region and not under tension using a spacer. Plastic sheaths were then removed and the ends of tape were cut just below the abdominal incision. Placement was again confirmed to be not under tension using a spacer. Following this, the incision was then irrigated and appeared hemostatic. The vaginal mucosa was then closed with running 2-0 Vicryl suture. Excellent hemostasis was noted. Suprapubic skin incisions were closed with Dermabond. Rectal exam was performed and was normal. Sponge, lap, needle and instrument counts were correct. The patient was repositioned in supine position and anesthesia was reversed without difficulty. The patient tolerated procedure well and was taken to recovery in stable condition.

## 2021-03-02 NOTE — ANESTHESIA POSTPROCEDURE EVALUATION
Procedure(s):  SUBURETHRAL SLING ADVANTAGE  CYSTOSCOPY NICKEL ALLERGY.     general    Anesthesia Post Evaluation      Multimodal analgesia: multimodal analgesia used between 6 hours prior to anesthesia start to PACU discharge  Patient location during evaluation: PACU  Patient participation: complete - patient participated  Level of consciousness: awake and alert  Pain management: adequate  Airway patency: patent  Anesthetic complications: no  Cardiovascular status: acceptable and hemodynamically stable  Respiratory status: acceptable  Hydration status: acceptable  Post anesthesia nausea and vomiting:  controlled  Final Post Anesthesia Temperature Assessment:  Normothermia (36.0-37.5 degrees C)      INITIAL Post-op Vital signs:   Vitals Value Taken Time   /62 03/02/21 1153   Temp 37.1 °C (98.7 °F) 03/02/21 1123   Pulse 84 03/02/21 1153   Resp 16 03/02/21 1153   SpO2 96 % 03/02/21 1153

## 2021-03-02 NOTE — PERIOP NOTES
300mL instilled in bladder via gramajo. Gramajo removed per Dr. Librado Flores. Pt voided 250ml pink tinged urine without difficulty.

## 2021-03-02 NOTE — ANESTHESIA PREPROCEDURE EVALUATION
Relevant Problems   No relevant active problems       Anesthetic History     PONV          Review of Systems / Medical History  Pertinent labs reviewed    Pulmonary  Within defined limits                 Neuro/Psych         Psychiatric history     Cardiovascular                  Exercise tolerance: >4 METS     GI/Hepatic/Renal                Endo/Other      Hypothyroidism: well controlled       Other Findings              Physical Exam    Airway  Mallampati: II  TM Distance: 4 - 6 cm  Neck ROM: normal range of motion   Mouth opening: Normal     Cardiovascular    Rhythm: regular  Rate: normal         Dental         Pulmonary  Breath sounds clear to auscultation               Abdominal         Other Findings            Anesthetic Plan    ASA: 2  Anesthesia type: general          Induction: Intravenous  Anesthetic plan and risks discussed with: Patient and Spouse

## 2021-03-02 NOTE — DISCHARGE INSTRUCTIONS
Caring for yourself after Vaginal Prolapse Repair or Sling      General care: You will be sore and it will take time to heal after surgery; as a rule, you will gradually get better and need less pain medication on a daily basis. Diet: Start with easy, bland foods and resume your regular diet as tolerated. Avoid foods that are greasy or give you gas. Keep well hydrated, drinking at least 8 glasses of fluids a day. Do not drink any alcohol while taking narcotic pain medications. Activity: You should take short walks frequently after surgery, but no strenuous activity. You may climb stairs, slowly and carefully. In addition to the surgical reconstruction you just underwent, what you do or should we say dont do is as important in the success of your repair. We recommend no heavy housework for the first 6-8 weeks. No lifting greater than 5-10 pounds for at least 6 weeks, although the more heavy lifting you do in general can compromise our repair. You may drive in general in about 1-2 weeks. DO NOT DRIVE WHILE TAKING NARCOTIC MEDICAITION. Also, do not drive until you are moving (standing, sitting, walking) easily without pain or hesitation. You may use your stairs at home after discharge, increasing as the days go on. This is all designed with the theory of reducing the amount of abdominal straining which can lead to a longer lasting surgical correction. No bathing or swimming but you may shower. Listen to your body. Rawleigh Billing if you are receiving any signals that you are doing too much (pain, pulling, bleeding), then stop doing it!! Wound care: You may shower after your surgery. Your incisions are in the vagina. You may have small incisions in the fold of your thigh or just above your pubic bone. These are closed with sterile skin glue and will feel like a scab. They will fall off in time. Do not pick at them. You may also have sutures between your rectum and vagina. These sutures will dissolve on their own.  If there is any irritation, swelling, or worsening redness of your surgical wounds, please call the office. You may use ice packs on and off (no more than 20 minutes on at a time) for the first 72 hours. You will have some vaginal bleeding tapering off to spotting for up to several weeks. If it becomes heavier than you would have expected please give us a call. Other limitations: Do not put anything in the vagina (do not have sex, douche, or use tampons) until cleared by your doctor. Do not soak in a bath, pool, hot tub, or the ocean for at least 2 weeks or until your doctor says it is okay. When to call your doctor: If you develop a fever >101, chills, nausea, vomiting, problems urinating, increasing abdominal pain, or concerns with your incisions, call your doctor. If you experience heavy vaginal bleeding or large blood clots, call your doctor; light spotting after surgery is normal and is a sign of healing, even 3-4 weeks after surgery. If you have swelling, redness or pain in your legs, call your doctor. If you have trouble breathing, chest pain, or any other emergency, you should come in to our Emergency triage area and/or call 911. If, at any time, you have questions or concerns, worsening pain or belly pain, you should feel free to call your doctor. Pain control: Continue narcotic pain medications as prescribed by your doctor as needed. Ibuprofen (Advil, Motrin) should be the main medication you use for pain control unless otherwise instructed by your doctor. Take ibuprofen with food, and do not take more than is recommended or prescribed. Please use caution if taking extra Acetaminophen (Tylenol) as most narcotic medications already contain this, and taking too much is dangerous. Please call our office or talk to your pharmacist if you have any questions. Bowel Regimen: You may not have a bowel movement for several days post operatively.  Please keep bowl movements soft by adding extra fiber to your diet and drinking plenty of fluids. Please supplement that with:    o Colace 100MG- one tablet 2 times per day (other stool softener is acceptable)  o Miralax powder (laxative) - one tablespoon dissolved in 8 ounce of water of juice every day    If you are unable to have a bowel movement by the 4th or 5th day after your operation, please try some Milk of magnesium or alternatively you may call the office. Once bowel movements are regular and easy, you may gradually wean yourself off of the laxatives and stool softeners. Follow-up appointment: Please call the office for an appointment to see your doctors approximately four weeks after your surgery. Please call the office sooner if you have any questions or concerns. Catheter care (if applicable): if you have had difficulty voiding after the surgery or if your doctor feels it is best you may be sent home with a catheter. If so, rest assured that this is a short term problem. You will be given a leg bad and a large overnight bag on discharge and the appropriate education to care for both catheters. You may shower with the catheter in place. Please call the office to schedule a time to come in within the next few days to have the catheter removed. After general anesthesia or intravenous sedation, for 24 hours or while taking prescription Narcotics:  · Limit your activities  · A responsible adult needs to be with you for the next 24 hours  · Do not drive and operate hazardous machinery  · Do not make important personal or business decisions  · Do  not drink alcoholic beverages  · If you have not urinated within 8 hours after discharge, please contact your surgeon on call. · If you have sleep apnea and have a CPAP machine, please use it for all naps and sleeping. · Please use caution when taking narcotics and any of your home medications that may cause drowsiness. *  Please give a list of your current medications to your Primary Care Provider.   *  Please update this list whenever your medications are discontinued, doses are      changed, or new medications (including over-the-counter products) are added. *  Please carry medication information at all times in case of emergency situations. These are general instructions for a healthy lifestyle:  No smoking/ No tobacco products/ Avoid exposure to second hand smoke  Surgeon General's Warning:  Quitting smoking now greatly reduces serious risk to your health. Obesity, smoking, and sedentary lifestyle greatly increases your risk for illness  A healthy diet, regular physical exercise & weight monitoring are important for maintaining a healthy lifestyle    You may be retaining fluid if you have a history of heart failure or if you experience any of the following symptoms:  Weight gain of 3 pounds or more overnight or 5 pounds in a week, increased swelling in our hands or feet or shortness of breath while lying flat in bed. Please call your doctor as soon as you notice any of these symptoms; do not wait until your next office visit.

## 2021-03-31 PROBLEM — N89.8 VAGINAL DISCHARGE: Status: ACTIVE | Noted: 2021-03-31

## 2021-06-01 ENCOUNTER — HOSPITAL ENCOUNTER (OUTPATIENT)
Dept: PHYSICAL THERAPY | Age: 66
Discharge: HOME | End: 2021-06-01
Attending: NURSE PRACTITIONER
Payer: MEDICARE

## 2021-06-01 DIAGNOSIS — R42 VERTIGO: ICD-10-CM

## 2021-06-01 PROCEDURE — 97161 PT EVAL LOW COMPLEX 20 MIN: CPT

## 2021-06-01 PROCEDURE — 97112 NEUROMUSCULAR REEDUCATION: CPT

## 2021-06-01 NOTE — PROGRESS NOTES
Cody Mathis  : 1955  [place]  Secondary: Natanael Hunter at [facility]  [address] [facility], [address] [place] [address],[address], [facility] [address]  [phone]   [phone]         OUTPATIENT PHYSICAL THERAPY: Daily Treatment Note 2021  Visit Count:  1    Vestibular, R PC    Pre-treatment Symptoms/Complaints:  See eval  Pain: Initial:   0/10 Post Session:  0/10   Medications Last Reviewed:  2021  Updated Objective Findings:  See evaluation note from today  TREATMENT:     NEUROMUSCULAR RE-EDUCATION: (10 minutes):  Exercise/activities per grid below to improve balance, coordination, kinesthetic sense, posture and proprioception. Required maximal verbal and manual cues to promote static and dynamic balance in standing, promote coordination of bilateral, lower extremity(s) and promote motor control of bilateral, lower extremity(s). Date:  21 Date:   Date:     Activity/Exercise Parameters Parameters Parameters   R PC epley      Eye tracking Objects at home                                       Cytonics  Treatment/Session Summary:    · Response to Treatment:  pt had no nystagmus with Hallpike, but several symptoms with R PC positioning, pt stated she felt different after maneuver. Given HEP to track items at home. check horiz canals next visit. UA today due to sever symptoms with PC. .  · Communication/Consultation:  None today  · Equipment provided today:  None today  · Recommendations/Intent for next treatment session: Next visit will focus on vestibular strengthening, balance, .     Total Treatment Billable Duration:  12 minutes  PT Patient Time In/Time Out  Time In: 1100  Time Out: [facility]    Future Appointments   Date Time Provider Lita Fine   6/3/2021  1:45 PM [facility] OP PHYSICIAL THERAPIST [facility]ES [facility]   2021  2:30 PM [facility] OP PHYSICIAL THERAPIST [facility]   2021  3:15 PM [facility] OP PHYSICIAL THERAPIST SFEORPT [facility]   6/24/2021  2:30 PM [facility] OP PHYSICIAL THERAPIST SFEORPT S[facility]   6/29/2021  1:45 PM [facility] OP PHYSICIAL THERAPIST SFEORPT SFE   7/7/2021  1:30 PM Magda Rouse DO [facility] B[facility]   7/27/2021  8:40 AM IMD LAB [facility] IMD IMD   8/3/2021  1:30 PM Arjun Abreu, NP [facility] IMD IMD        Visit Approval Visit # Therapist initials Date A NS Cx Comments    1 jh 6/1/21 [x]  [] [] Initial evaluation       [] [] []        [] [] []        [] [] []        [] [] []        [] [] []        [] [] []        [] [] []        [] [] []        [] [] []        [] [] []        [] [] []        [] [] []        [] [] []        [] [] []        [] [] []        [] [] []        [] [] []

## 2021-06-01 NOTE — THERAPY EVALUATION
Nathanael Lyon  : 1955  [place]  Secondary: Natanael Hunter at [place] [address] [facility], [address] [place] [address],[address], [place] [address].  [phone]   [phone]           OUTPATIENT PHYSICAL THERAPY:Initial Assessment 2021   ICD-10: Treatment Diagnosis:   Muscle weakness (generalized) (M62.81); Difficulty in walking, not elsewhere classified (R26.2)  Dizziness and giddiness (R42); Lack of coordination (R27.9)   Precautions/Allergies:   Latex; Adhesive tape; Lortab [hydrocodone-acetaminophen]; Minocycline; and Other plant, animal, environmental   TREATMENT PLAN:  Effective Dates: 2021 TO 2021 (90 days). Frequency/Duration: 2 times a week for 90 Day(s) MEDICAL/REFERRING DIAGNOSIS:  Vertigo [R42]   DATE OF ONSET: 3-4mos ago  REFERRING PHYSICIAN: Fabian Romero NP MD Orders: eval, treat  Return MD Appointment: unknown     INITIAL ASSESSMENT:  Ms. Elisabeth Garcia presents with reports of disequilibrium and vertigo, which contribute to disorders in PNS, CNS, musculoskeletal balance disorder and conversion disorder avoiding ADL functions. The impairment is a vestibular hypofunction and  hyperfunction from vertigo. Pt presents with a  Visual- somatosensory vestibular mismatch as evident by his/her CTSIB score of  8 /18. Other contributing factors include gaze nystagmus, and smooth pursuit deficit. Pt will benefit from skilled and sophisticated physical therapy strengthening and balance refinement. PROBLEM LIST (Impacting functional limitations):  1. Decreased Strength  2. Decreased ADL/Functional Activities  3. Decreased Balance  4. Decreased Activity Tolerance INTERVENTIONS PLANNED: (Treatment may consist of any combination of the following)  1. Balance Exercise  2. Bed Mobility  3. Gait Training  4. Home Exercise Program (HEP)  5. Manual Therapy  6. Neuromuscular Re-education/Strengthening  7.  Range of Motion (ROM)  8. Therapeutic Activites  9. Therapeutic Exercise/Strengthening  10. Transfer Training     GOALS: (Goals have been discussed and agreed upon with patient.)  Short-Term Functional Goals: Time Frame: 6 weeks  1. Pt will be independent with range of motion, strength and balance home exercise program.  2. Pt will increase Dizziness Handicap Inventory Scale to 50/100 indicating increased balance and decreased risk for falls. 3. Pt will test 10/18pts on CTSIB. Discharge Goals: Time Frame: 12 weeks  1. Pt will be able to tandem walk 20ft  2. Pt will increase Dizziness Handicap Inventory Scale to 30/100 indicating increased balance and decreased risk for falls. 3. Pt will test 15/18pts on CTSIB. 4. Pt will report no symptoms for 1 week performing functional activities. OUTCOME MEASURE:   Tool Used: Dizziness Handicap Index  Score:  Initial: 72/100  Most Recent:  (Date: -- )   Interpretation of Score: To each item, the following scores may be assigned: No = 0, Sometimes = 2, Yes = 4. Scores greater than 10 points should be referred to balance specialists for further evaluation. CTSIB=8/18  MEDICAL NECESSITY:   · Skilled intervention continues to be required due to decreased function. REASON FOR SERVICES/OTHER COMMENTS:  · Patient continues to require skilled intervention due to medical complications and patient unable to attend/participate in therapy as expected. Total Duration:  PT Patient Time In/Time Out  Time In: 1100  Time Out: 1150    Rehabilitation Potential For Stated Goals: Good  Regarding Isaías Johnston's therapy, I certify that the treatment plan above will be carried out by a therapist or under their direction. Thank you for this referral,  Nai Kraus DPT     Referring Physician Signature: Chicho Oreilly NP _______________________________ Date _____________     PAIN/SUBJECTIVE:   Initial:   0 Post Session:  0/10   HISTORY:   History of Injury/Illness (Reason for Referral):   Pt states she has had prior hx of vertigo episodes about 5 yrs ago, 3 yrs ago, 1 yr ago and now latest 3-4mos ago. In the past she has been treated with medication, mostly meclizine. Her most recent episode she discovered that PT can asst with condition and requested PT during MD visit on Apr. 23.2021. she reports she has difficulty with bending especially noticed in shower, rolling around in bed, sitting at EOB (which is different sensation than rolling in bed), mild nausea, looking up, but she does not have symptoms when walking around, quick movements. Pt admits several trips to DDS and plans more due to xray findings, she reports pain in R ear, denies Na and caffeine intake and admits water intake low. Past Medical History/Comorbidities:   Ms. Laisha Villalobos  has a past medical history of Anxiety, Atypical facial pain, Chronic pain, Chronic sinusitis, Depression, Deviated nasal septum, Esophageal reflux (7/18/2016), Eustachian tube dysfunction, GERD (gastroesophageal reflux disease), History of blood transfusion (1992), History of mycoplasma pneumonia (1998), History of osteomyelitis, History of shingles (2020), chlamydia infection, Lyme disease (2002), Hypothyroidism due to Hashimoto's thyroiditis (5/11/2017), Migraine, Mild depression ([address] (4/18/2018), Nausea & vomiting, Thyroid disease (2004), and Vaginitis and vulvovaginitis. She also has no past medical history of Breast cancer ([address] Coagulation defects, COPD, Difficult intubation, Malignant hyperthermia due to anesthesia, Pseudocholinesterase deficiency, or Unspecified adverse effect of anesthesia.   Ms. Laisha Villalobos  has a past surgical history that includes pr breast surgery procedure unlisted (0862,[id number],8802); pr breast surgery procedure unlisted (1992); hx heent (1964); hx heent; hx yaneth and bso (1999); hx gyn (1981); hx breast reduction (Bilateral, 1992); hx colonoscopy (2013); hx refractive surgery (2001); hx heent (4/13/05); hx other surgical (Bilateral, 06/7/05); hx breast biopsy (Left, 1992, 1999, 2002); hx breast reconstruction (Bilateral); and hx bladder suspension (03/02/2021). Social History/Living Environment:     lives in 1 story with spouse. Prior Level of Function/Work/Activity:  Pt is highly active, no AD used, IND with all ADLs, drives, shops, does not work. Ambulatory/Rehab Services H2 Model Falls Risk Assessment   Risk Factors:       (1)  Dizziness/Vertigo       (2)  Any administered antiepileptics/anticonvulsants Ability to Rise from Chair:       (0)  Ability to rise in a single movement   Falls Prevention Plan:       No modifications necessary   Total: (5 or greater = High Risk): 3   ©2010 [facility] [employer]. All Rights Reserved. [facility] Patent #[id number]. Federal Law prohibits the replication, distribution or use without written permission from [facility]   Current Medications:       Current Outpatient Medications:     LORazepam (ATIVAN) 0.5 mg tablet, Take 1 Tab by mouth every six (6) hours as needed for Anxiety. Max Daily Amount: 2 mg. Do not drive while on this medication. Indications: anxious, Disp: 60 Tab, Rfl: 1    zinc sulfate (ZINC-220 PO), Take  by mouth daily. , Disp: , Rfl:     levothyroxine (SYNTHROID) 50 mcg tablet, Take 1 Tab by mouth Daily (before breakfast). Indications: a condition with low thyroid hormone levels (Patient taking differently: Take 50 mcg by mouth Daily (before breakfast). Take / use AM day of surgery  per anesthesia protocols. Indications: a condition with low thyroid hormone levels), Disp: 90 Tab, Rfl: 3    Cholecalciferol, Vitamin D3, 50,000 unit cap, Take 1 Cap by mouth every seven (7) days. , Disp: , Rfl:     MULTIVITAMINS W-MINERALS (MULTI-VIT 55 PLUS PO), Take 1 Tab by mouth every morning.  Stopped 3/3/10, Disp: , Rfl:    Date Last Reviewed:  6/1/2021     Number of Personal Factors/Comorbidities that affect the Plan of Care: 0: LOW COMPLEXITY   EXAMINATION:   Balance: CTSIB, C1=2/3; C2=1/3; C3=2/3; C4=3/3; C5 fail in 7sec; C6-fail in 10sec  Oculomotor Exam:  · Eye Range of Motion:  full range of motion  · Cervical Range of Motion:   diminished range of motion  · Spontaneous nystagmus:  NO  · Gaze holding nystagmus:  YES  · Skew Deviation:  NEG  · Smooth Pursuit:      []Smooth Eye Movements    [x]Delayed Eye Movements     []Within Normal Limits     []Other(comment): · Voluntary Saccades:      [x]Smooth Eye Movements    []Delayed Eye Movements     []Within Normal Limits     []Other(comment):   Vestibular Ocular Reflex Testing:    · Head Thrust Test: Not tested due to symptoms, will test next visit. .  · VOR Cancellation: POS  Position Tests:  · Hallpike-Parish testing presented as positive when head is turned to the right indicating that Benign Paroxysmal Positional Vertigo (BPPV) isresolved on the right. Range of Motion             Joint: Date: 6/1/21   Date:   Date:       Right Left Right Left Right Left   Ankle DF  WNL WNL           Hip flexion  WNL WNL                        CERVICAL               SB 50 75       Rot 100 50       Fwd bend 100        Bwd bend 90 with symptom inc.              STRENGTH               Joint: Date: 6/1/21   Date:   Date:       Right Left Right Left Right Left   Ankle DF 5/5 5/5           Hip flexion 5/5 5/5                           Body Structures Involved:  1. Nerves  2. Eyes and Ears  3. Muscles  4. Ligaments Body Functions Affected:  1. Mental  2. Sensory/Pain  3. Neuromusculoskeletal  4. Movement Related Activities and Participation Affected:  1. General Tasks and Demands  2. Mobility  3. Self Care  4. Domestic Life  5.  Community, Social and [place]   Number of elements (examined above) that affect the Plan of Care: 1-2: LOW COMPLEXITY   CLINICAL PRESENTATION:   Presentation: Stable and uncomplicated: LOW COMPLEXITY   CLINICAL DECISION MAKING:   Use of outcome tool(s) and clinical judgement create a POC that gives a: Clear prediction of patient's progress: LOW COMPLEXITY

## 2021-06-03 ENCOUNTER — HOSPITAL ENCOUNTER (OUTPATIENT)
Dept: PHYSICAL THERAPY | Age: 66
Discharge: HOME | End: 2021-06-03
Attending: NURSE PRACTITIONER
Payer: MEDICARE

## 2021-06-03 PROCEDURE — 97112 NEUROMUSCULAR REEDUCATION: CPT

## 2021-06-03 NOTE — PROGRESS NOTES
Pia   : 1955  [place]  Secondary: Natanael Hunter at [address], [address], [place] [address].  [phone]   [phone]         OUTPATIENT PHYSICAL THERAPY: Daily Treatment Note 6/3/2021  Visit Count:  2    Vestibular, R PC    Pre-treatment Symptoms/Complaints:  Pt states no symptoms after maneuver. Reports on ladder didn't have symptoms when looking up   Pain: Initial:   0/10 Post Session:  0/10   Medications Last Reviewed:  6/3/2021  Updated Objective Findings:  No change  TREATMENT:     NEUROMUSCULAR RE-EDUCATION: (40 minutes):  Exercise/activities per grid below to improve balance, coordination, kinesthetic sense, posture and proprioception. Required maximal verbal and manual cues to promote static and dynamic balance in standing, promote coordination of bilateral, lower extremity(s) and promote motor control of bilateral, lower extremity(s). Date:  21 Date:  6/3/21 Date:     Activity/Exercise Parameters Parameters Parameters   R PC epley      Eye tracking Objects at home     standing  Romberg, tandem, no support, 7hp, EO,EC    Hip strategy  In corner 3 way, EO, EC    Tandem walk  20ft, EO                    Med[employer] Portal  Treatment/Session Summary:    · Response to Treatment:  pt had difficulty with hip straegy, but got better with progress, no symptoms with exer. More fails to R. Will be on vaction next week then return the following week, attempt balance boards next visit. · Communication/Consultation:  None today  · Equipment provided today:  None today  · Recommendations/Intent for next treatment session: Next visit will focus on vestibular strengthening, balance, .     Total Treatment Billable Duration:  42 minutes  PT Patient Time In/Time Out  Time In: 1345  Time Out: Patricia 85, DPT    Future Appointments   Date Time Provider Lita Fine   2021 2:30 PM SFE OP PHYSICIAL THERAPIST SFEORPT SFE   6/22/2021  3:15 PM SFE OP PHYSICIAL THERAPIST SFEORPT SFE   6/24/2021  2:30 PM SFE OP PHYSICIAL THERAPIST SFEORPT SFE   6/29/2021  1:45 PM SFE OP PHYSICIAL THERAPIST SFEORPT SFE   7/7/2021  1:30 PM Carlos Rouse DO BSUG BSUG   7/27/2021  8:40 AM IMD LAB SSA IMD IMD   8/3/2021  1:30 PM Nataly Jones, DANY SSA IMD IMD        Visit Approval Visit # Therapist initials Date A NS Cx Comments    1  6/1/21 [x]  [] [] Initial evaluation    2  6/3/21 [x] [] []        [] [] []        [] [] []        [] [] []        [] [] []        [] [] []        [] [] []        [] [] []        [] [] []        [] [] []        [] [] []        [] [] []        [] [] []        [] [] []        [] [] []        [] [] []        [] [] []

## 2021-06-17 ENCOUNTER — APPOINTMENT (OUTPATIENT)
Dept: PHYSICAL THERAPY | Age: 66
End: 2021-06-17
Attending: NURSE PRACTITIONER
Payer: MEDICARE

## 2021-06-22 ENCOUNTER — HOSPITAL ENCOUNTER (OUTPATIENT)
Dept: PHYSICAL THERAPY | Age: 66
Discharge: HOME | End: 2021-06-22
Attending: NURSE PRACTITIONER
Payer: MEDICARE

## 2021-06-22 PROCEDURE — 97112 NEUROMUSCULAR REEDUCATION: CPT

## 2021-06-22 NOTE — PROGRESS NOTES
Luz Elena Waddell  : 1955  [place]  Secondary: Natanael Hunter at [facility]  [address] [facility], [facility] [place] [address],[address], [address]  [phone]   [phone]         OUTPATIENT PHYSICAL THERAPY: Daily Treatment Note 2021  Visit Count:  3    Vestibular, R PC    Pre-treatment Symptoms/Complaints:  Pt states she went white water rafting with no issues and was very surprised. Pain: Initial:   6/10 R zygotmatic level on cheek Post Session:  010   Medications Last Reviewed:  2021  Updated Objective Findings:  No changes noticed  TREATMENT:     NEUROMUSCULAR RE-EDUCATION: (40 minutes):  Exercise/activities per grid below to improve balance, coordination, kinesthetic sense, posture and proprioception. Required maximal verbal and manual cues to promote static and dynamic balance in standing, promote coordination of bilateral, lower extremity(s) and promote motor control of bilateral, lower extremity(s). Date:  21 Date:  6/3/21 Date:  21   Activity/Exercise Parameters Parameters Parameters   R PC epley      Eye tracking Objects at home     standing  Romberg, tandem, no support, 7hp, EO,EC Romberg, tandem, no support, 7hp, EO,EC   Hip strategy  In corner 3 way, EO, EC    Tandem walk  20ft, EO 40ft EC                   MedBridge Portal  Treatment/Session Summary:    · Response to Treatment:  Extra time needed teaching WD using both feet, Improved since last visit with hip strategy including self recovery. · Communication/Consultation:  None today  · Equipment provided today:  None today  · Recommendations/Intent for next treatment session: Next visit will focus on vestibular strengthening, balance, .     Total Treatment Billable Duration:  42 minutes  PT Patient Time In/Time Out  Time In: 1515  Time Out: [address]  , [facility]    Future Appointments   Date Time Provider Lita Fine   2021  7:00 PM LUKASZ OP PHYSICIAL THERAPIST SFEORPT SFE   6/29/2021  1:45 PM SFE OP PHYSICIAL THERAPIST SFEORPT SFE   7/7/2021  1:30 PM Mejia Rouse DO BSUG BSUG   7/27/2021  8:40 AM IMD LAB SSA IMD IMD   8/3/2021  1:30 PM Shelley Savage, NP SSA IMD IMD        Visit Approval Visit # Therapist initials Date A NS Cx Comments    1  6/1/21 [x]  [] [] Initial evaluation    2  6/3/21 [x] [] []     3  6/22/21 [] [] []        [] [] []        [] [] []        [] [] []        [] [] []        [] [] []        [] [] []        [] [] []        [] [] []        [] [] []        [] [] []        [] [] []        [] [] []        [] [] []        [] [] []        [] [] []

## 2021-06-24 ENCOUNTER — APPOINTMENT (OUTPATIENT)
Dept: PHYSICAL THERAPY | Age: 66
End: 2021-06-24
Attending: NURSE PRACTITIONER
Payer: MEDICARE

## 2021-06-29 ENCOUNTER — HOSPITAL ENCOUNTER (OUTPATIENT)
Dept: PHYSICAL THERAPY | Age: 66
Discharge: HOME | End: 2021-06-29
Attending: NURSE PRACTITIONER
Payer: MEDICARE

## 2021-06-29 PROCEDURE — 97112 NEUROMUSCULAR REEDUCATION: CPT

## 2021-06-29 NOTE — THERAPY DISCHARGE
Chelsea Lynn  : 1955  [place]  Secondary: Natanael Baird 75 at [place] [id number] [facility], [address] [place] [address],[address], [facility] [address]  [phone]   [phone]         OUTPATIENT PHYSICAL THERAPY: Daily Treatment and Discharge Note 2021  Visit Count:  4    Vestibular, R PC    Pre-treatment Symptoms/Complaints:  Pt states she feels fine  Pain: Initial:   0/10  Post Session:  0/10   Medications Last Reviewed:  2021  Updated Objective Findings:  No changes noticed  TREATMENT:     NEUROMUSCULAR RE-EDUCATION: (40 minutes):  Exercise/activities per grid below to improve balance, coordination, kinesthetic sense, posture and proprioception. Required maximal verbal and manual cues to promote static and dynamic balance in standing, promote coordination of bilateral, lower extremity(s) and promote motor control of bilateral, lower extremity(s). Date:  21 Date:  6/3/21 Date:  21 Date  21   Activity/Exercise Parameters Parameters Parameters    R PC epley       Eye tracking Objects at home      standing  Romberg, tandem, no support, 7hp, EO,EC Romberg, tandem, no support, 7hp, EO,EC Romberg, tandem, no support, 7hp,,EC   Hip strategy  In corner 3 way, EO, EC     Tandem walk  20ft, EO 40ft EC EO/EC, 50ft   x1    Tandem stance            Short-Term Functional Goals: Time Frame: 6 weeks  1. Pt will be independent with range of motion, strength and balance home exercise program. GOAL MET 21  2. Pt will increase Dizziness Handicap Inventory Scale to 50/100 indicating increased balance and decreased risk for falls. GOAL MET 21  3. Pt will test 10/18pts on CTSIB. Goal met 21  Discharge Goals: Time Frame: 12 weeks  1. Pt will be able to tandem walk 20ft  GOAL MET 21  2. Pt will increase Dizziness Handicap Inventory Scale to 30/100 indicating increased balance and decreased risk for falls.  GOAL MET 6/29/21  3. Pt will test 15/18pts on CTSIB. Goal met 6/29/21  4. Pt will report no symptoms for 1 week performing functional activities. GOAL MET 6/29/21     OUTCOME MEASURE:   Tool Used: Dizziness Handicap Index  Score:  Initial: 72/100  Most Recent: 0/100 (Date: 6/29/21 )   Interpretation of Score: To each item, the following scores may be assigned: No = 0, Sometimes = 2, Yes = 4. Scores greater than 10 points should be referred to balance specialists for further evaluation. 6/1/21;  CTSIB, C1=2/3; C2=1/3; C3=2/3; C4=3/3; C5 fail in 7sec; C6-fail in 10sec  6/29/21   CTSIB C1-4=12/12, C5=2/3, C6=3/3= 17/18  [employer] Portal  Treatment/Session Summary:    · Response to Treatment:  Epley handout given. Improved, all goals met. Dc.  · Communication/Consultation:  None today  · Equipment provided today:  None today  · Recommendations/Intent for next treatment session: Next visit will focus on vestibular strengthening, balance, .     Total Treatment Billable Duration:  42 minutes  PT Patient Time In/Time Out  Time In: 1345  Time Out: Patricia Doll, DPT    Future Appointments   Date Time Provider Lita Fine   7/7/2021  1:30 PM Larkin Severin, DO BSKVNG BSUG   7/27/2021  8:40 AM IMD LAB SSA IMD IMD   8/3/2021  1:30 PM Constantino Dover NP SSA IMD IMD        Visit Approval Visit # Therapist initials Date A NS Cx Comments    1  6/1/21 [x]  [] [] Initial evaluation    2  6/3/21 [x] [] []     3  6/22/21 [x] [] []     4  6/29/21 [x] [] []        [] [] []        [] [] []        [] [] []        [] [] []        [] [] []        [] [] []        [] [] []        [] [] []        [] [] []        [] [] []        [] [] []        [] [] []        [] [] []        [] [] []

## 2021-11-13 ENCOUNTER — HOSPITAL ENCOUNTER (EMERGENCY)
Age: 66
Discharge: HOME | End: 2021-11-13
Payer: MEDICARE

## 2021-11-13 VITALS
TEMPERATURE: 98.2 F | DIASTOLIC BLOOD PRESSURE: 72 MMHG | SYSTOLIC BLOOD PRESSURE: 141 MMHG | OXYGEN SATURATION: 99 % | RESPIRATION RATE: 19 BRPM | HEART RATE: 68 BPM

## 2021-11-13 VITALS
DIASTOLIC BLOOD PRESSURE: 72 MMHG | OXYGEN SATURATION: 99 % | TEMPERATURE: 98.24 F | SYSTOLIC BLOOD PRESSURE: 141 MMHG | HEART RATE: 68 BPM | RESPIRATION RATE: 19 BRPM

## 2021-11-13 VITALS — BODY MASS INDEX: 25 KG/M2

## 2021-11-13 DIAGNOSIS — N30.01: Primary | ICD-10-CM

## 2021-11-13 LAB
PH,URINE: 6 (ref 5–8.5)
SPECIFIC GRAVITY, URINE: 1.01 (ref 1–1.03)
UROBILINOGEN,URINE: 0.2 EU/DL
UTC LEUKOCYTE ESTERASE,URINE: (no result)

## 2021-11-13 PROCEDURE — 87086 URINE CULTURE/COLONY COUNT: CPT

## 2021-11-13 PROCEDURE — G0463 HOSPITAL OUTPT CLINIC VISIT: HCPCS

## 2021-11-13 PROCEDURE — 87088 URINE BACTERIA CULTURE: CPT

## 2021-11-13 PROCEDURE — 99202 OFFICE O/P NEW SF 15 MIN: CPT

## 2021-11-13 PROCEDURE — 87186 SC STD MICRODIL/AGAR DIL: CPT

## 2021-11-13 PROCEDURE — 81003 URINALYSIS AUTO W/O SCOPE: CPT

## 2022-01-13 ENCOUNTER — HOSPITAL ENCOUNTER (OUTPATIENT)
Dept: PHYSICAL THERAPY | Age: 67
Discharge: HOME | End: 2022-01-13
Attending: NURSE PRACTITIONER
Payer: MEDICARE

## 2022-01-13 DIAGNOSIS — R42 VERTIGO: ICD-10-CM

## 2022-01-13 PROCEDURE — 97112 NEUROMUSCULAR REEDUCATION: CPT

## 2022-01-13 PROCEDURE — 97161 PT EVAL LOW COMPLEX 20 MIN: CPT

## 2022-01-13 NOTE — PROGRESS NOTES
Rosendo Meadows  : 1955  [place]  Secondary: Natanael Baird [address] at [address] [address], [address], [place], [facility]  [phone]   [phone]        OUTPATIENT PHYSICAL THERAPY: Daily Treatment Note 2022  Visit Count:  1    R vertigo, R PC    Pre-treatment Symptoms/Complaints:  See eval  Pain: Initial:   0/10 Post Session:  0/10   Medications Last Reviewed:  2022  Updated Objective Findings:  See evaluation note from today  TREATMENT:     NEUROMUSCULAR RE-EDUCATION: (10 minutes):  Exercise/activities per grid below to improve balance, coordination, kinesthetic sense and proprioception. Required maximal manual cues to promote static and dynamic balance in standing, promote coordination of bilateral, lower extremity(s) and promote motor control of bilateral, lower extremity(s). Date:  22 Date:   Date:     Activity/Exercise Parameters Parameters Parameters   epley maneuver R PC                                             MedBridge Portal  Treatment/Session Summary:    · Response to Treatment:  pt had POS reaction with Halpike and able to comple Epley maneuver with feeling of improvement after 5min. Reminder to stay off R SL for tonight. · Communication/Consultation:  None today  · Equipment provided today:  None today  · Recommendations/Intent for next treatment session: Next visit will focus on balance, canalathiasis maneuvers.     Total Treatment Billable Duration:  10 minutes  PT Patient Time In/Time Out  Time In: 6948  Time Out: [address]    Future Appointments   Date Time Provider Lita Fine   2022  1:45 PM Leeroy Grant DPT [facility] SFD   2022  4:30 PM SFE [facility] ROOM 2 ERMAM E   3/16/2022 10:45 AM Inocencio Libman, MD [facility]        Visit Approval Visit # Therapist initials Date A NS Cx Comments    1  22 [x]  [] [] Initial evaluation [] [] []        [] [] []        [] [] []        [] [] []        [] [] []        [] [] []        [] [] []        [] [] []        [] [] []        [] [] []        [] [] []        [] [] []        [] [] []        [] [] []        [] [] []        [] [] []        [] [] []

## 2022-01-13 NOTE — THERAPY EVALUATION
Silvina Harris  : 1955  [place]  Secondary: Natanael Hunter at [facility], [address] [facility], [place] [address] [address] [facility]  [phone]   [phone]          OUTPATIENT PHYSICAL THERAPY:Initial Assessment 2022   ICD-10: Treatment Diagnosis:   Muscle weakness (generalized) (M62.81); Difficulty in walking, not elsewhere classified (R26.2)  Dizziness and giddiness (R42); Lack of coordination (R27.9)  Repeated falls (R29.6); Precautions/Allergies:   Latex; Adhesive tape; Hydrocodone-acetaminophen; Other plant, animal, environmental; and Minocycline   TREATMENT PLAN:  Effective Dates: 2022 TO 3/14/2022 (60 days). Frequency/Duration: 2 times a week for 60 Day(s) MEDICAL/REFERRING DIAGNOSIS:  Vertigo [R42]   DATE OF ONSET: Oct.2021  REFERRING PHYSICIAN: Israel Lake NP MD Orders: eval, treat  Return MD Appointment: unknown     INITIAL ASSESSMENT:  Ms. Christiano Marques presents with reports of disequilibrium and vertigo, which contribute to disorders in PNS, CNS, musculoskeletal balance disorder and conversion disorder avoiding ADL functions. The impairment is a vestibular  hyperfunction from vertigo. Pt presents with a  visual vestibular mismatch as evident by his/her CTSIB score of  10/12. Other contributing factors include   Pt will benefit from skilled and sophisticated physical therapy strengthening and balance refinement. PROBLEM LIST (Impacting functional limitations):  1. Decreased Strength  2. Decreased Balance  3. canalathiasis INTERVENTIONS PLANNED: (Treatment may consist of any combination of the following)  1. Balance Exercise  2. Gait Training  3. Home Exercise Program (HEP)  4. Manual Therapy  5. Neuromuscular Re-education/Strengthening  6. Therapeutic Activites  7.  Therapeutic Exercise/Strengthening     GOALS: (Goals have been discussed and agreed upon with patient.)  Discharge Goals: Time Frame: 12 weeks  1. Pt will be able to tandem walk 20ft  2. Pt will increase Dizziness Handicap Inventory Scale to 4/100 indicating increased balance and decreased risk for falls. 3. Pt will test 12/12 on mCTSIB. 4. Pt will report no symptoms for 1 week performing functional activities. OUTCOME MEASURE:   Tool Used: Dizziness Handicap Index  Score:  Initial: 88  Most Recent:  (Date: -- )   Interpretation of Score: To each item, the following scores may be assigned: No = 0, Sometimes = 2, Yes = 4. Scores greater than 10 points should be referred to balance specialists for further evaluation. MEDICAL NECESSITY:   · Skilled intervention continues to be required due to decfreased function. REASON FOR SERVICES/OTHER COMMENTS:  · Patient continues to require skilled intervention due to medical complications and patient unable to attend/participate in therapy as expected. Total Duration:  PT Patient Time In/Time Out  Time In: 1515  Time Out: 1600    Rehabilitation Potential For Stated Goals: Excellent  Regarding Jennifer Johnston's therapy, I certify that the treatment plan above will be carried out by a therapist or under their direction. Thank you for this referral,  Terri Greenfield DPT     Referring Physician Signature: Janice Reinoso NP _______________________________ Date _____________     PAIN/SUBJECTIVE:   Initial:   0 Post Session:  0/10   HISTORY:   History of Injury/Illness (Reason for Referral):  Pt states in Oct. 2021 she discovered vertigo symptoms similar to a previous visit 8 months ago. She admits she was feeling great following last episode and admitting she survived DDS appts and several vacations. Recently she went to [place] to care for mother when event occurred. She states cause may have originated from [address] [facility] as well as medication given for infection. She was away from area until Dec.31 and immediately went to MD for referral to PT.  She believes she is having trouble with her R side. Past Medical History/Comorbidities:   Ms. Scotty Garcia  has a past medical history of Anxiety, Atypical facial pain, Chronic pain, Chronic sinusitis, Depression, Deviated nasal septum, Esophageal reflux (7/18/2016), Eustachian tube dysfunction, GERD (gastroesophageal reflux disease), History of blood transfusion (1992), History of mycoplasma pneumonia (1998), History of osteomyelitis, History of shingles (2020), chlamydia infection, Lyme disease (2002), Hypothyroidism due to Hashimoto's thyroiditis (5/11/2017), Migraine, Mild depression ([address] (4/18/2018), Nausea & vomiting, Thyroid disease (2004), and Vaginitis and vulvovaginitis. She has no past medical history of Breast cancer ([facility] [address] Coagulation defects, COPD, Difficult intubation, Malignant hyperthermia due to anesthesia, Pseudocholinesterase deficiency, or Unspecified adverse effect of anesthesia. Ms. Scotty Garcia  has a past surgical history that includes pr breast surgery procedure unlisted ([id number]); pr breast surgery procedure unlisted (1992); hx heent (1964); hx heent; hx yaneth and bso (1999); hx gyn (1981); hx breast reduction (Bilateral, 1992); hx colonoscopy (2013); hx refractive surgery (2001); hx heent (4/13/05); hx other surgical (Bilateral, 06/7/05); hx breast biopsy (Left, 1992, 1999, 2002); hx breast reconstruction (Bilateral); and hx bladder suspension (03/02/2021). Social History/Living Environment:     pt lives in house with spouse. Prior Level of Function/Work/Activity:  [employer] with all ADLs, no aD used, active outdoors. Drives, retired. Ambulatory/Rehab Services H2 Model Falls Risk Assessment   Risk Factors:       (1)  Dizziness/Vertigo Ability to Rise from Chair:       (0)  Ability to rise in a single movement   Falls Prevention Plan:       No modifications necessary   Total: (5 or greater = High Risk): 1   ©2010 [employer]. All Rights Reserved. [facility] Patent #[id number].  Federal Law prohibits the replication, distribution or use without written permission from [facility] [employer] [facility]   Current Medications:       Current Outpatient Medications:     LORazepam (ATIVAN) 0.5 mg tablet, Take 1 Tablet by mouth two (2) times daily as needed for Anxiety. Max Daily Amount: 1 mg. Do not drive while on this medication. Indications: anxious, Disp: 60 Tablet, Rfl: 0    levothyroxine (SYNTHROID) 50 mcg tablet, Take 1 Tablet by mouth Daily (before breakfast). Indications: a condition with low thyroid hormone levels, Disp: 90 Tablet, Rfl: 3    zinc sulfate (ZINC-220 PO), Take  by mouth daily. , Disp: , Rfl:     Cholecalciferol, Vitamin D3, 50,000 unit cap, Take 1 Cap by mouth every seven (7) days. , Disp: , Rfl:     MULTIVITAMINS W-MINERALS (MULTI-VIT 55 PLUS PO), Take 1 Tab by mouth every morning. Stopped 3/3/10, Disp: , Rfl:    Date Last Reviewed:  1/13/2022     Number of Personal Factors/Comorbidities that affect the Plan of Care: 0: LOW COMPLEXITY   EXAMINATION:   Balance:          MCTSIB=10/12, C4=1/3  Oculomotor Exam:  · Eye Range of Motion:  full range of motion  · Cervical Range of Motion:   full range of motion  · Spontaneous nystagmus:  NO  · Gaze holding nystagmus:  NO  · Skew Deviation:  nt  · Smooth Pursuit:      [x]Smooth Eye Movements    []Delayed Eye Movements     []Within Normal Limits     []Other(comment): · Voluntary Saccades:      [x]Smooth Eye Movements    []Delayed Eye Movements     []Within Normal Limits     []Other(comment):         Position Tests:  · Hallpike-Burdette testing presented as positive when head is turned to the right indicating that Benign Paroxysmal Positional Vertigo (BPPV) ispresent on the right.       Range of Motion             Joint: Date: 1-13-22   Date:   Date:       Right Left Right Left Right Left   Ankle DF  WNL WNL           Hip flexion  WNL WNL                        CERVICAL               SB WNL WNL       Rot WNL WNL       Fwd bend WNL        Bwd bend WNL              STRENGTH Joint: Date: 1-13-22   Date:   Date:       Right Left Right Left Right Left   Ankle DF 5/5 5/5           Hip flexion 5/5 5/5                           Body Structures Involved:  1. Nerves  2. Eyes and Ears  3. Bones  4. Joints  5. Muscles  6. Ligaments Body Functions Affected:  1. Mental  2. Sensory/Pain  3. Neuromusculoskeletal  4. Movement Related Activities and Participation Affected:  1. General Tasks and Demands  2. Mobility  3. Self Care  4. Domestic Life  5.  Community, Social and [place]   Number of elements (examined above) that affect the Plan of Care: 1-2: LOW COMPLEXITY   CLINICAL PRESENTATION:   Presentation: Stable and uncomplicated: LOW COMPLEXITY   CLINICAL DECISION MAKING:   Use of outcome tool(s) and clinical judgement create a POC that gives a: Clear prediction of patient's progress: LOW COMPLEXITY

## 2022-01-18 ENCOUNTER — HOSPITAL ENCOUNTER (OUTPATIENT)
Dept: PHYSICAL THERAPY | Age: 67
Discharge: HOME | End: 2022-01-18
Attending: NURSE PRACTITIONER
Payer: MEDICARE

## 2022-01-18 PROCEDURE — 97112 NEUROMUSCULAR REEDUCATION: CPT

## 2022-01-18 NOTE — PROGRESS NOTES
Joel Jon  : 1955  [place]  Secondary: Natanael Baird [address] at [address] [facility] [address], [place], [address] [facility]  [phone]   [phone]        OUTPATIENT PHYSICAL THERAPY: Daily Treatment Note 2022  Visit Count:  2    R vertigo, R PC    Pre-treatment Symptoms/Complaints:  Pt states she still has symptoms but more with wavy not really spinning. Pain: Initial:   0/10 Post Session:  0/10   Medications Last Reviewed:  2022  Updated Objective Findings:  None today  TREATMENT:     NEUROMUSCULAR RE-EDUCATION: (40 minutes):  Exercise/activities per grid below to improve balance, coordination, kinesthetic sense and proprioception. Required maximal manual cues to promote static and dynamic balance in standing, promote coordination of bilateral, lower extremity(s) and promote motor control of bilateral, lower extremity(s). Date:  22 Date:  22 Date:     Activity/Exercise Parameters Parameters Parameters   epley maneuver R PC R PC    Eye tracking  Around clinic    gait  w head turns    x1  Fwd, R rot,                           Med[employer] Portal  Treatment/Session Summary:    · Response to Treatment:  pt has symptoms of disembarkment. Emphasis on eye tracking and x1 exer with head movement today. Communication/Consultation:  None today  · Equipment provided today:  None today  · Recommendations/Intent for next treatment session: Next visit will focus on balance, canalathiasis maneuvers.     Total Treatment Billable Duration:  40 minutes  PT Patient Time In/Time Out  Time In: 5801  Time Out: Patricia Doll DPT    Future Appointments   Date Time Provider Lita Fine   2022  4:30 PM SFE [facility] ROOM [facility]   2022 10:15 AM MARCOS Kidd   2022  1:00 PM Ralph Faustin DPT [facility]   3/16/2022 10:45 AM Brice Barrett MD [facility] [address] [place] [address] Visit Approval Visit # Therapist initials Date A NS Cx Comments    1 jh 1-13-22 [x]  [] [] Initial evaluation    2  1-18-22 [x] [] []        [] [] []        [] [] []        [] [] []        [] [] []        [] [] []        [] [] []        [] [] []        [] [] []        [] [] []        [] [] []        [] [] []        [] [] []        [] [] []        [] [] []        [] [] []        [] [] []

## 2022-01-24 ENCOUNTER — HOSPITAL ENCOUNTER (OUTPATIENT)
Dept: MAMMOGRAPHY | Age: 67
Discharge: HOME | End: 2022-01-24
Attending: OBSTETRICS & GYNECOLOGY
Payer: MEDICARE

## 2022-01-24 DIAGNOSIS — Z12.31 OTHER SCREENING MAMMOGRAM: ICD-10-CM

## 2022-01-24 PROCEDURE — 77067 SCR MAMMO BI INCL CAD: CPT

## 2022-01-25 ENCOUNTER — APPOINTMENT (OUTPATIENT)
Dept: PHYSICAL THERAPY | Age: 67
End: 2022-01-25
Attending: NURSE PRACTITIONER
Payer: MEDICARE

## 2022-01-27 ENCOUNTER — HOSPITAL ENCOUNTER (OUTPATIENT)
Dept: PHYSICAL THERAPY | Age: 67
Discharge: HOME | End: 2022-01-27
Attending: NURSE PRACTITIONER
Payer: MEDICARE

## 2022-01-27 PROCEDURE — 97110 THERAPEUTIC EXERCISES: CPT

## 2022-01-27 PROCEDURE — 97140 MANUAL THERAPY 1/> REGIONS: CPT

## 2022-01-27 NOTE — PROGRESS NOTES
Jeremiah Patel  : 1955  [place]  Secondary: Natanael Baird [address] at [address] [facility] [address], [address], [place], [facility] [address] [facility]  [phone]   [phone]        OUTPATIENT PHYSICAL THERAPY: Daily Treatment Note 2022  Visit Count:  3    R vertigo, R PC    Pre-treatment Symptoms/Complaints:  Pt states her symptoms are foggy, uncertain, unsteady. symptoms cont with HAs, and may correlate with each other, went to chiropractor and felt really bad the other day. Pain: Initial:   8/10, superoir head Post Session:  4/10,    Medications Last Reviewed:  2022  Updated Objective Findings:  None today  TREATMENT:     NEUROMUSCULAR RE-EDUCATION: (0 minutes):  Exercise/activities per grid below to improve balance, coordination, kinesthetic sense and proprioception. Required maximal manual cues to promote static and dynamic balance in standing, promote coordination of bilateral, lower extremity(s) and promote motor control of bilateral, lower extremity(s). MANUAL THERAPY: (30 minutes): Joint mobilization, Soft tissue mobilization, Manipulation and Complex decongestive physiotherapy was utilized and necessary because of the patient's restricted joint motion, painful spasm, loss of articular motion and restricted motion of soft tissue. THERAPEUTIC EXERCISE: (10 minutes):  Exercises per grid below to improve mobility, strength, balance and coordination. Required maximal verbal cues to promote proper body alignment, promote proper body posture and promote proper body mechanics. Progressed range and complexity of movement as indicated.        Date:  22 Date:  22 Date:  22   Activity/Exercise Parameters Parameters Parameters   epley maneuver R PC R PC    Eye tracking  Around clinic    gait  w head turns    x1  Fwd, R rot,     manual   Suture relief with ear distraction, 2 dir, zygomatic arch distraction,, cold compress   Post neck stretch   against wall, [id number], w shld retraction op             MedBridge Portal  Treatment/Session Summary:    · Response to Treatment:  pt reports feeling decreased HAs after man. Especially with superior pain. feels disconnected from earth, needs to focus on vision to see distance, and needs extra effort to gather words to be articulate. It appears that vertigo symtoms are HA induced due to releif from HA with improved vertigo symptoms. Pt reported after postural stretching all other symptoms decreased to tolerable but not resolved. · Communication/Consultation:  None today  · Equipment provided today:  None today  · Recommendations/Intent for next treatment session: Next visit will focus on balance, canalathiasis maneuvers.     Total Treatment Billable Duration:  40 minutes  PT Patient Time In/Time Out  Time In: 1300  Time Out: Peri Ignacio 51, DPT    Future Appointments   Date Time Provider Lita Fine   2/1/2022 10:15 AM Cydney Cason DPT [facility]   2/3/2022  1:45 PM Cydney Cason DPT [facility]   3/16/2022 10:45 AM Ayesha Jones MD [facility]        Visit Approval Visit # Therapist initials Date A NS Cx Comments    1  1-13-22 [x]  [] [] Initial evaluation    2  1-18-22 [x] [] []     3  1-27-22 [x] [] []        [] [] []        [] [] []        [] [] []        [] [] []        [] [] []        [] [] []        [] [] []        [] [] []        [] [] []        [] [] []        [] [] []        [] [] []        [] [] []        [] [] []        [] [] []

## 2022-02-01 ENCOUNTER — APPOINTMENT (OUTPATIENT)
Dept: PHYSICAL THERAPY | Age: 67
End: 2022-02-01
Attending: NURSE PRACTITIONER
Payer: MEDICARE

## 2022-02-04 ENCOUNTER — HOSPITAL ENCOUNTER (OUTPATIENT)
Dept: PHYSICAL THERAPY | Age: 67
Discharge: HOME | End: 2022-02-04
Attending: NURSE PRACTITIONER
Payer: MEDICARE

## 2022-02-04 PROBLEM — F41.1 GAD (GENERALIZED ANXIETY DISORDER): Status: ACTIVE | Noted: 2022-02-04

## 2022-02-04 PROCEDURE — 97140 MANUAL THERAPY 1/> REGIONS: CPT

## 2022-02-04 PROCEDURE — 97110 THERAPEUTIC EXERCISES: CPT

## 2022-02-04 NOTE — THERAPY DISCHARGE
Miladys Hunt  : 1955  [place]  Secondary: Natanael Baird [address] at [address] [facility] [address], [address], [place], [facility] [address] [facility]  [phone]   [phone]        OUTPATIENT PHYSICAL THERAPY: Daily Treatment Note 2022  Visit Count:  4    R vertigo, R PC    Pre-treatment Symptoms/Complaints:  Pt states  No symptoms in 5 days, no HAs, when HAs occur and increasing dysequilibrium,   Pain: Initial:   010, Post Session:  0/10,    Medications Last Reviewed:  2022  Updated Objective Findings:  None today  TREATMENT:     NEUROMUSCULAR RE-EDUCATION: (0 minutes):  Exercise/activities per grid below to improve balance, coordination, kinesthetic sense and proprioception. Required maximal manual cues to promote static and dynamic balance in standing, promote coordination of bilateral, lower extremity(s) and promote motor control of bilateral, lower extremity(s). MANUAL THERAPY: (30 minutes): Joint mobilization, Soft tissue mobilization, Manipulation and Complex decongestive physiotherapy was utilized and necessary because of the patient's restricted joint motion, painful spasm, loss of articular motion and restricted motion of soft tissue. THERAPEUTIC EXERCISE: (10 minutes):  Exercises per grid below to improve mobility, strength, balance and coordination. Required maximal verbal cues to promote proper body alignment, promote proper body posture and promote proper body mechanics. Progressed range and complexity of movement as indicated.        Date:  22 Date:  22 Date:  22 Date  22   Activity/Exercise Parameters Parameters Parameters    epley maneuver R PC R PC     Eye tracking  Around clinic  Romberg, tandem, no UE support, cerv rot and SB tracking ball,    gait  w head turns     x1  Fwd, R rot,   In rot and SB,    manual   Suture relief with ear distraction, 2 dir, zygomatic arch distraction,, cold compress Fam trn: Suture relief with ear distraction, 2 dir, zygomatic arch distraction,   Post neck stretch   against wall, [id number], w shld retraction op fam trn: both spouses          Discharge Goals: Time Frame: 12 weeks  1. Pt will be able to tandem walk 20ft. GOAL MET 2-4-22  2. Pt will increase Dizziness Handicap Inventory Scale to 4/100 indicating increased balance and decreased risk for falls. GOAL MET 2-4-22  3. Pt will test 12/12 on mCTSIB. GOAL MET 2-4-22  4. Pt will report no symptoms for 1 week performing functional activities. GOAL MET 2-4-22    [employer]  Treatment/Session Summary:    · Response to Treatment:  pt reports feeling great after session, [facility] trn on different techniques to apply for HAs and to address HAs prior to any vestibular exer. Since it was discovered HAs drive intensity of vestibular deficits.    · Communication/Consultation:  None today  · Equipment provided today:  None today  · Recommendations/Intent for next treatment session: discharge from PT    Total Treatment Billable Duration:  40 minutes  PT Patient Time In/Time Out  Time In: 1600  Time Out: [address] [facility]    Future Appointments   Date Time Provider Lita Fine   2/23/2022  3:00 PM Cordelia Rolon NP [facility] [address] [place] [address]   3/16/2022 10:45 AM Rachel Dwyer MD [facility]        Visit Approval Visit # Therapist initials Date A NS Cx Comments    1  1-13-22 [x]  [] [] Initial evaluation    2  1-18-22 [x] [] []     3  1-27-22 [x] [] []     4  2-4--22 [x] [] [] dc       [] [] []        [] [] []        [] [] []        [] [] []        [] [] []        [] [] []        [] [] []        [] [] []        [] [] []        [] [] []        [] [] []        [] [] []        [] [] []        [] [] []

## 2022-02-22 PROBLEM — U07.1 COVID-19 VIRUS INFECTION: Status: ACTIVE | Noted: 2022-01-05

## 2022-03-18 PROBLEM — N89.8 VAGINAL DISCHARGE: Status: ACTIVE | Noted: 2021-03-31

## 2022-03-18 PROBLEM — Z79.890 HORMONE REPLACEMENT THERAPY (HRT): Status: ACTIVE | Noted: 2017-05-11

## 2022-03-18 PROBLEM — E55.9 HYPOVITAMINOSIS D: Status: ACTIVE | Noted: 2017-01-17

## 2022-03-19 PROBLEM — R40.0 SOMNOLENCE, DAYTIME: Status: ACTIVE | Noted: 2017-05-11

## 2022-03-19 PROBLEM — E03.9 PRIMARY HYPOTHYROIDISM: Status: ACTIVE | Noted: 2017-04-06

## 2022-03-19 PROBLEM — N39.41 URGE INCONTINENCE: Status: ACTIVE | Noted: 2021-02-11

## 2022-03-19 PROBLEM — E78.5 DYSLIPIDEMIA (HIGH LDL; LOW HDL): Status: ACTIVE | Noted: 2017-01-17

## 2022-03-19 PROBLEM — N39.3 SUI (STRESS URINARY INCONTINENCE, FEMALE): Status: ACTIVE | Noted: 2021-02-11

## 2022-03-19 PROBLEM — F41.1 GAD (GENERALIZED ANXIETY DISORDER): Status: ACTIVE | Noted: 2022-02-04

## 2022-03-19 PROBLEM — F32.A MILD DEPRESSION: Status: ACTIVE | Noted: 2018-04-18

## 2022-03-19 PROBLEM — R53.82 CHRONIC FATIGUE: Status: ACTIVE | Noted: 2017-05-11

## 2022-03-19 PROBLEM — E06.3 HYPOTHYROIDISM DUE TO HASHIMOTO'S THYROIDITIS: Status: ACTIVE | Noted: 2017-05-11

## 2022-03-19 PROBLEM — U07.1 COVID-19 VIRUS INFECTION: Status: ACTIVE | Noted: 2022-01-05

## 2022-03-19 PROBLEM — E03.8 HYPOTHYROIDISM DUE TO HASHIMOTO'S THYROIDITIS: Status: ACTIVE | Noted: 2017-05-11

## 2022-03-20 PROBLEM — E66.3 OVERWEIGHT (BMI 25.0-29.9): Status: ACTIVE | Noted: 2017-05-11

## 2022-05-18 DIAGNOSIS — E03.9 PRIMARY HYPOTHYROIDISM: Primary | ICD-10-CM

## 2022-05-18 DIAGNOSIS — E78.00 PURE HYPERCHOLESTEROLEMIA: ICD-10-CM

## 2022-05-19 ENCOUNTER — HOSPITAL ENCOUNTER (EMERGENCY)
Age: 67
Discharge: HOME | End: 2022-05-19
Payer: MEDICARE

## 2022-05-19 VITALS
DIASTOLIC BLOOD PRESSURE: 76 MMHG | RESPIRATION RATE: 19 BRPM | HEART RATE: 74 BPM | TEMPERATURE: 98.06 F | OXYGEN SATURATION: 98 % | SYSTOLIC BLOOD PRESSURE: 144 MMHG

## 2022-05-19 VITALS — BODY MASS INDEX: 24.2 KG/M2

## 2022-05-19 VITALS
TEMPERATURE: 98.06 F | SYSTOLIC BLOOD PRESSURE: 144 MMHG | DIASTOLIC BLOOD PRESSURE: 76 MMHG | HEART RATE: 74 BPM | RESPIRATION RATE: 98 BRPM

## 2022-05-19 DIAGNOSIS — M54.6: ICD-10-CM

## 2022-05-19 DIAGNOSIS — R11.0: ICD-10-CM

## 2022-05-19 DIAGNOSIS — Z88.6: ICD-10-CM

## 2022-05-19 DIAGNOSIS — Z88.8: ICD-10-CM

## 2022-05-19 DIAGNOSIS — M79.10: ICD-10-CM

## 2022-05-19 DIAGNOSIS — Z79.899: ICD-10-CM

## 2022-05-19 DIAGNOSIS — Z88.5: ICD-10-CM

## 2022-05-19 DIAGNOSIS — N30.01: Primary | ICD-10-CM

## 2022-05-19 LAB
PH,URINE: 7 (ref 5–8.5)
SPECIFIC GRAVITY, URINE: 1.01 (ref 1–1.03)
UROBILINOGEN,URINE: 0.2 EU/DL
UTC LEUKOCYTE ESTERASE,URINE: (no result)

## 2022-05-19 PROCEDURE — 81003 URINALYSIS AUTO W/O SCOPE: CPT

## 2022-05-19 PROCEDURE — 87186 SC STD MICRODIL/AGAR DIL: CPT

## 2022-05-19 PROCEDURE — 87088 URINE BACTERIA CULTURE: CPT

## 2022-05-19 PROCEDURE — 87086 URINE CULTURE/COLONY COUNT: CPT

## 2022-05-19 PROCEDURE — G0463 HOSPITAL OUTPT CLINIC VISIT: HCPCS

## 2022-05-19 PROCEDURE — 99213 OFFICE O/P EST LOW 20 MIN: CPT

## 2022-06-20 RX ORDER — FLUOXETINE HYDROCHLORIDE 20 MG/1
CAPSULE ORAL
Qty: 30 CAPSULE | Refills: 2 | OUTPATIENT
Start: 2022-06-20

## 2022-06-24 ENCOUNTER — OFFICE VISIT (OUTPATIENT)
Dept: OBGYN CLINIC | Age: 67
End: 2022-06-24
Payer: MEDICARE

## 2022-06-24 VITALS
BODY MASS INDEX: 25.16 KG/M2 | WEIGHT: 142 LBS | HEIGHT: 63 IN | DIASTOLIC BLOOD PRESSURE: 76 MMHG | SYSTOLIC BLOOD PRESSURE: 128 MMHG

## 2022-06-24 DIAGNOSIS — E03.8 HYPOTHYROIDISM DUE TO HASHIMOTO'S THYROIDITIS: ICD-10-CM

## 2022-06-24 DIAGNOSIS — R23.2 HOT FLASHES: ICD-10-CM

## 2022-06-24 DIAGNOSIS — E06.3 HYPOTHYROIDISM DUE TO HASHIMOTO'S THYROIDITIS: ICD-10-CM

## 2022-06-24 DIAGNOSIS — R61 CHRONIC NIGHT SWEATS: Primary | ICD-10-CM

## 2022-06-24 DIAGNOSIS — N89.8 VAGINAL DRYNESS: ICD-10-CM

## 2022-06-24 PROBLEM — N39.3 SUI (STRESS URINARY INCONTINENCE, FEMALE): Status: ACTIVE | Noted: 2021-02-11

## 2022-06-24 LAB
BASOPHILS # BLD: 0 K/UL (ref 0–0.2)
BASOPHILS NFR BLD: 1 % (ref 0–2)
DIFFERENTIAL METHOD BLD: NORMAL
EOSINOPHIL # BLD: 0.3 K/UL (ref 0–0.8)
EOSINOPHIL NFR BLD: 7 % (ref 0.5–7.8)
ERYTHROCYTE [DISTWIDTH] IN BLOOD BY AUTOMATED COUNT: 12.5 % (ref 11.9–14.6)
HCT VFR BLD AUTO: 40.1 % (ref 35.8–46.3)
HGB BLD-MCNC: 13.3 G/DL (ref 11.7–15.4)
IMM GRANULOCYTES # BLD AUTO: 0 K/UL (ref 0–0.5)
IMM GRANULOCYTES NFR BLD AUTO: 0 % (ref 0–5)
LYMPHOCYTES # BLD: 1.7 K/UL (ref 0.5–4.6)
LYMPHOCYTES NFR BLD: 34 % (ref 13–44)
MCH RBC QN AUTO: 30.2 PG (ref 26.1–32.9)
MCHC RBC AUTO-ENTMCNC: 33.2 G/DL (ref 31.4–35)
MCV RBC AUTO: 91.1 FL (ref 79.6–97.8)
MONOCYTES # BLD: 0.5 K/UL (ref 0.1–1.3)
MONOCYTES NFR BLD: 9 % (ref 4–12)
NEUTS SEG # BLD: 2.5 K/UL (ref 1.7–8.2)
NEUTS SEG NFR BLD: 49 % (ref 43–78)
NRBC # BLD: 0 K/UL (ref 0–0.2)
PLATELET # BLD AUTO: 265 K/UL (ref 150–450)
PMV BLD AUTO: 11.4 FL (ref 9.4–12.3)
RBC # BLD AUTO: 4.4 M/UL (ref 4.05–5.2)
T4 FREE SERPL-MCNC: 1.1 NG/DL (ref 0.9–1.8)
TSH, 3RD GENERATION: 1.36 UIU/ML (ref 0.36–3.74)
WBC # BLD AUTO: 5 K/UL (ref 4.3–11.1)

## 2022-06-24 PROCEDURE — G8427 DOCREV CUR MEDS BY ELIG CLIN: HCPCS | Performed by: OBSTETRICS & GYNECOLOGY

## 2022-06-24 PROCEDURE — G8417 CALC BMI ABV UP PARAM F/U: HCPCS | Performed by: OBSTETRICS & GYNECOLOGY

## 2022-06-24 PROCEDURE — G8399 PT W/DXA RESULTS DOCUMENT: HCPCS | Performed by: OBSTETRICS & GYNECOLOGY

## 2022-06-24 PROCEDURE — 99214 OFFICE O/P EST MOD 30 MIN: CPT | Performed by: OBSTETRICS & GYNECOLOGY

## 2022-06-24 PROCEDURE — 3017F COLORECTAL CA SCREEN DOC REV: CPT | Performed by: OBSTETRICS & GYNECOLOGY

## 2022-06-24 PROCEDURE — 1036F TOBACCO NON-USER: CPT | Performed by: OBSTETRICS & GYNECOLOGY

## 2022-06-24 PROCEDURE — 1123F ACP DISCUSS/DSCN MKR DOCD: CPT | Performed by: OBSTETRICS & GYNECOLOGY

## 2022-06-24 PROCEDURE — 1090F PRES/ABSN URINE INCON ASSESS: CPT | Performed by: OBSTETRICS & GYNECOLOGY

## 2022-06-24 RX ORDER — LEVOTHYROXINE SODIUM 0.05 MG/1
TABLET ORAL
COMMUNITY
Start: 2022-04-19 | End: 2022-08-24 | Stop reason: SDUPTHER

## 2022-06-24 RX ORDER — LORAZEPAM 0.5 MG/1
TABLET ORAL
COMMUNITY
Start: 2022-05-30 | End: 2022-06-27 | Stop reason: SDUPTHER

## 2022-06-24 RX ORDER — ESTRADIOL 0.1 MG/G
1 CREAM VAGINAL DAILY
Qty: 42.5 G | Refills: 12 | Status: SHIPPED | OUTPATIENT
Start: 2022-06-24

## 2022-06-24 RX ORDER — NITROFURANTOIN 25; 75 MG/1; MG/1
CAPSULE ORAL
COMMUNITY
Start: 2022-06-18 | End: 2022-08-23

## 2022-06-24 NOTE — PROGRESS NOTES
2022    Belinda Goldberg  1955    78 y.o. No obstetric history on file. female with complaints of hot flashes, night sweats, vag dryness. Pain with sex. Hot flashes and nt sweats for 2 yrs. Getting worse with more awakening. Pain with sex worse over last 6-8mos  3 UTIs since Dec. At least the last 2 had + cultures. Normal mammogram   Pt s/p javon  for benign indications. Then I did BSO later. Stopped hrt 4-5yrs ago. Has never used vaginal estrogen. Still having to go to [place] a lot to take care of her mother. But things are more settled. Her mother thriving in assisted living. The house etc has been dispositioned. Will be seeing PCP at [facility] in 1-2mos. Patient's last menstrual period was 1999.       OB History        3    Para        Term   3            AB        Living           SAB        IAB        Ectopic        Molar        Multiple        Live Births                    Past Medical History:   Diagnosis Date    Anxiety     Atypical facial pain     localized to rt maxill    Chronic pain     neck, back and shoulders from tension    Chronic sinusitis     Depression     Deviated nasal septum     Esophageal reflux 2016    Eustachian tube dysfunction     GERD (gastroesophageal reflux disease)     History of blood transfusion     History of mycoplasma pneumonia     History of osteomyelitis     h/o osteomylitis of [id number]- still having problems    History of shingles     Hx of chlamydia infection     treated     Hx of Lyme disease     Hypothyroidism due to Hashimoto's thyroiditis 2017    Migraine     Mild depression ([address] 2018    Nausea & vomiting     post op nausea and vomiting    Thyroid disease 2004    Hashimoto's    Vaginitis and vulvovaginitis      Past Surgical History:   Procedure Laterality Date    BLADDER SUSPENSION  2021    BREAST BIOPSY Left , ,     benign mass    BREAST RECONSTRUCTION Bilateral 1992    BREAST REDUCTION SURGERY Bilateral 1992    BREAST SURGERY  1992    reconstruction    BREAST SURGERY  [id number]    left breast x3    COLONOSCOPY  2013   78 [address]    Tubal Ligation    HEENT  1964    tonsilectomy    HEENT  4/13/05    SEPTO/FESS    HEENT      jaw surg r/t osteomylitits-- 2004    OTHER SURGICAL HISTORY Bilateral 06/7/05    Tube Placement    REFRACTIVE SURGERY  2001    JESSIE AND BSO (CERVIX REMOVED)  1999    Hysterectomy & BSO       Current Outpatient Medications on File Prior to Visit   Medication Sig Dispense Refill    levothyroxine (SYNTHROID) 50 MCG tablet TAKE 1 TABLET BY MOUTH EVERY DAY BEFORE BREAKFAST      LORazepam (ATIVAN) 0.5 MG tablet TAKE 1 TABLET BY MOUTH TWICE A DAY AS NEEDED FOR ANXIETY      nitrofurantoin, macrocrystal-monohydrate, (MACROBID) 100 MG capsule       vitamin D (CHOLECALCIFEROL) 36040 UNIT CAPS Take 1 capsule by mouth every 7 days       No current facility-administered medications on file prior to visit. Family and social history noncontributory.     Review of Systems  Constitutional:  Denies unexplained weight loss/gain or heat/cold intolerance or loss of balance  ENT: Denies blurred vision, loss of hearing, hoarseness  Cardiovascular:  Denies chest pain, swelling in legs or feet, shortness of breath when lying flat  Respiratory:  Denies shortness of breath, cough greater than 2 weeks or coughing up blood  Gastro: Denies diarrhea greater than 2 weeks, rectal bleeding, bloody stools, heartburn, or black/tarry stools  :  Denies blood in urine, nocturia, dysuria or incontinence  Breast:  Denies nipple discharge, masses or pain  Skin:  Denies rash greater than 2 weeks, change in moles  Musculoskeletal/Neuro:  Denies joint pain, muscle weakness, seizures, headaches  Psych:  Denies new onset depression, crying spells, anxiety, panic attacks  Heme:  Denies easy bruising, bleeding gums, frequent nosebleeds or swollen lymph nodes  GYN:  Denies bleeding or spotting between menses, heavy menses, menses longer than 7 days,  severe menstrual cramps, bleeding after sex    Patient referred to PCP for significant non-gyn complaints. PHYSICAL EXAM:  /76   Ht 5' 2.5\" (1.588 m)   Wt 142 lb (64.4 kg)   LMP 01/01/1999   BMI 25.56 kg/m²    Body mass index is 25.56 kg/m². The patient appears well, alert, oriented x 3, in no apparent distress. Speech and thought content appropriate. Affect bright. Well developed. Well nourished. HEENT: atraumatic, normocephalic. Sclerae nonicteric. Neck supple w/o thyromegaly   Pelvic: Normal external genitalia, urethral meatus, vagina and cervix. Bladder, uterus, adnexae nontender and w/o obvious masses. Perineum and anus normal. No hemorrhoids. Neuro grossly intact. Holli Em was seen today for menopause. Diagnoses and all orders for this visit:    Chronic night sweats  -     CBC with Auto Differential; Future  -     CBC with Auto Differential    Hot flashes  -     TSH; Future  -     T4, Free; Future  -     T4, Free  -     TSH    Vaginal dryness    Hypothyroidism due to Hashimoto's thyroiditis  -     TSH; Future  -     T4, Free; Future  -     T4, Free  -     TSH    Other orders  -     estradiol (ESTRACE VAGINAL) 0.1 MG/GM vaginal cream; Place 1 g vaginally daily       D/w pt the use of vaginal estrogen for dryness and pain with sex. Use qhs for at least 2wks, then 1-2x/wk. The hot flashes etc are most likely d/t menopause. cking TSH to make sure her thyroid med dose is correct however. And cbc to ck differential.   We reviewed ERT, the overall rec to not start this >10yrs past menopause. Pt is 78, but she is a healthy \"young\" 65yo. She may end up wanting to be on it for QOL, which currently is very poor. Will 1st try relizen.  She is going to avoid estroven/promensil d/t sensitivity to soy in the past.   If relizen not helpful enough, may want to use an estrogen patch- d/w pts the benefits of avoiding 1st pass effect. We discussed the increased risk for stroke/dvt/breast ca. She sees me in Aug for AE. Sees her PCP after that.      Rosemarie Escobar MD, MD

## 2022-06-27 ENCOUNTER — OFFICE VISIT (OUTPATIENT)
Dept: BEHAVIORAL/MENTAL HEALTH CLINIC | Age: 67
End: 2022-06-27
Payer: MEDICARE

## 2022-06-27 VITALS
SYSTOLIC BLOOD PRESSURE: 104 MMHG | BODY MASS INDEX: 25.87 KG/M2 | OXYGEN SATURATION: 98 % | TEMPERATURE: 98.2 F | HEART RATE: 71 BPM | HEIGHT: 63 IN | WEIGHT: 146 LBS | DIASTOLIC BLOOD PRESSURE: 64 MMHG

## 2022-06-27 DIAGNOSIS — F32.A MILD DEPRESSION: ICD-10-CM

## 2022-06-27 DIAGNOSIS — F41.1 GAD (GENERALIZED ANXIETY DISORDER): Primary | ICD-10-CM

## 2022-06-27 PROCEDURE — 1123F ACP DISCUSS/DSCN MKR DOCD: CPT | Performed by: NURSE PRACTITIONER

## 2022-06-27 PROCEDURE — 3017F COLORECTAL CA SCREEN DOC REV: CPT | Performed by: NURSE PRACTITIONER

## 2022-06-27 PROCEDURE — G8427 DOCREV CUR MEDS BY ELIG CLIN: HCPCS | Performed by: NURSE PRACTITIONER

## 2022-06-27 PROCEDURE — G8417 CALC BMI ABV UP PARAM F/U: HCPCS | Performed by: NURSE PRACTITIONER

## 2022-06-27 PROCEDURE — 1090F PRES/ABSN URINE INCON ASSESS: CPT | Performed by: NURSE PRACTITIONER

## 2022-06-27 PROCEDURE — G8399 PT W/DXA RESULTS DOCUMENT: HCPCS | Performed by: NURSE PRACTITIONER

## 2022-06-27 PROCEDURE — 1036F TOBACCO NON-USER: CPT | Performed by: NURSE PRACTITIONER

## 2022-06-27 PROCEDURE — 99215 OFFICE O/P EST HI 40 MIN: CPT | Performed by: NURSE PRACTITIONER

## 2022-06-27 RX ORDER — FLUOXETINE HYDROCHLORIDE 20 MG/1
20 CAPSULE ORAL DAILY
Qty: 90 CAPSULE | Refills: 1 | Status: SHIPPED | OUTPATIENT
Start: 2022-06-27 | End: 2022-08-23

## 2022-06-27 RX ORDER — LORAZEPAM 1 MG/1
1 TABLET ORAL NIGHTLY
Qty: 30 TABLET | Refills: 2 | Status: SHIPPED | OUTPATIENT
Start: 2022-06-27 | End: 2022-08-23

## 2022-06-27 ASSESSMENT — PATIENT HEALTH QUESTIONNAIRE - PHQ9
SUM OF ALL RESPONSES TO PHQ QUESTIONS 1-9: 2
SUM OF ALL RESPONSES TO PHQ9 QUESTIONS 1 & 2: 2
2. FEELING DOWN, DEPRESSED OR HOPELESS: 1
1. LITTLE INTEREST OR PLEASURE IN DOING THINGS: 1

## 2022-06-27 NOTE — PROGRESS NOTES
OUTPATIENT PSYCHIATRIC RETURN VISIT PROGRESS NOTE    Date of Service: 6/27/2022     Identification    Christina Reyna is a 78 y.o.  with a past psychiatric history of JASON, depression who presents today for a psychiatric follow up appointment. CC:  Routine medication management follow up. Subjective / Interval History:    Pt comes to clinic today and reports that there have been significant situational stressors since our last visit. Patient has spent a great deal of time in [place] selling her mother's home and belonging's since her mother decided to move into an nursing home. Mother's finances , patient discovered, were in disarray as patient's brother had convinced her to take out a reverse mortgage on her home because he needed money,and does not feel accountable. Patient has been trying to come to terms with the implications of his actions and how to proceed with her relationship with him. This has added to her anxiety. She has sought the  of her mother's  re: legal matters. She agrees that a therapist would be helpful for her in dealing with some of the issues and her anxiety surrounding the situation and a referral will be placed. .   Pt has been medication compliant and denies any side-effects. Pt denies SI, HI and AVH. Does not endorse any manic or psychotic symptoms.     Psychiatric Review Of Systems:  Sleep: difficulty falling asleep  Appetite: ok  Current suicidal/homicidal ideations: Denies SI/ HI  Current auditory/visual hallucinations: Denies     Medications:    Current Outpatient Medications:     levothyroxine (SYNTHROID) 50 MCG tablet, TAKE 1 TABLET BY MOUTH EVERY DAY BEFORE BREAKFAST, Disp: , Rfl:     LORazepam (ATIVAN) 0.5 MG tablet, TAKE 1 TABLET BY MOUTH TWICE A DAY AS NEEDED FOR ANXIETY, Disp: , Rfl:     nitrofurantoin, macrocrystal-monohydrate, (MACROBID) 100 MG capsule, , Disp: , Rfl:     vitamin D (CHOLECALCIFEROL) 68018 UNIT CAPS, Take 1 capsule by mouth every 7 days, Disp: , Rfl:     estradiol (ESTRACE VAGINAL) 0.1 MG/GM vaginal cream, Place 1 g vaginally daily, Disp: 42.5 g, Rfl: 12       PMH:  Past Medical History:   Diagnosis Date    Anxiety     Atypical facial pain     localized to rt maxill    Chronic pain     neck, back and shoulders from tension    Chronic sinusitis     Depression     Deviated nasal septum     Esophageal reflux 7/18/2016    Eustachian tube dysfunction     GERD (gastroesophageal reflux disease)     History of blood transfusion 1992    History of mycoplasma pneumonia 1998    History of osteomyelitis     h/o osteomylitis of [id number]- still having problems    History of shingles 2020    Hx of chlamydia infection     treated 1994    Hx of Lyme disease 2002    Hypothyroidism due to Hashimoto's thyroiditis 5/11/2017    Migraine     Mild depression ([facility] [address] 4/18/2018    Nausea & vomiting     post op nausea and vomiting    Thyroid disease 2004    Hashimoto's    Vaginitis and vulvovaginitis        Vitals: There were no vitals filed for this visit. ROS:  Psychological ROS: positive for anxiety and sleep disturbance    Mental Status Exam:   Appearance  Appears stated age, Well-kept, Appropriately attired, Well-nourished, Cooperative and Maintains eye-contact  Behavior  Anxious, Cooperative and Pleasant  Psychomotor Activity within normal limits  Gait and Station Normal Josefina and Station and Normal Balance  Speech   appropriate and spontaneous  Mood    is anxious  Affect    congruent and spontaneous  Thought Process Goal-Directed and Circumstantial  Thought Content/Perceptual Disturbances free of delusions, free of hallucinations and not internally preoccupied  Cognition/Sensorium Alert, Fully oriented, Normal concentration/ Attention, Recent memory intact, Remote memory intact and Fund of knowledge adequate for level of education  Insight  good  Judgment good  Assessment:    Margarita Hill was seen today for follow-up.     Diagnoses and all orders for this visit:    JASON (generalized anxiety disorder)  -     [address]  -     LORazepam (ATIVAN) 1 MG tablet; Take 1 tablet by mouth nightly for 30 days. Mild depression ([address]  -     [address]    Other orders  -     FLUoxetine (PROZAC) 20 MG capsule; Take 1 capsule by mouth daily           Patient Education and Counseling:  Supportive therapy provided for identified psychosocial stressors. Medication education provided and decisions regarding medication regimen discussed with patient. Plan:  -  Continue fluoxetine 20 mg daily. Change lorazepam to 1 mg Q HS.  -  Crisis plan reviewed and patient verbally contracts for safety. Go to ED with emergent symptoms or safety concerns.  -  Risks, benefits, side effects of medications, including any / all black box warnings, discussed with patient, who verbalizes their understanding.     - [employer] web site checked for controlled substances. Disposition:  home    Follow Up:  Return in 3 months, or call in the interim for any side-effects, decompensation, questions, or problems between now and the next visit. Return in about 3 months (around 9/27/2022). 45 minutes face to face with the patient with more than 50% of the total time spent on education, counseling, & coordination of care of the patient regarding ongoing psychiatric illness.         [address]

## 2022-08-14 NOTE — PROGRESS NOTES
8/15/2022    Haja Soto  1955      PCP: [facility] Aggarwal - NP  Patient does see them for regular preventative visits. HPI: 78y.o. year old No obstetric history on file. Here for annual gyn wellness exam.   S/p JESSIE 1000 for benign indications. S/p BSO at a later date, also benign indications. Had sling early 2021. Having hot flashes and night sweats. Using [address]. No benefit yet. Has been on it 5-6wks. Ordered a 3mo supple. Vaginal estrogen has helped the vaginal symptoms. But her  is not addressing his impotence. Current other meds are only ativan and synthroid. Uses the ativan at night. Has ruminating thoughts at HS that adversely affect sleep. Sees [place] soon. Did not like how she felt on prozac. She also will probably get in with a therapist to help process the stress she has had the past 1-2yrs. Patient's last menstrual period was 01/01/1999. Social History     Socioeconomic History    Marital status:      Spouse name: None    Number of children: None    Years of education: None    Highest education level: None   Tobacco Use    Smoking status: Never    Smokeless tobacco: Never   Substance and Sexual Activity    Alcohol use: No    Drug use: No    Sexual activity: Yes     Partners: Male     Birth control/protection: Surgical     Comment: hysterectomy     Last pap - no hx abnl paps. No new sex partners. Lipids- followed by PCP, who she sees next wk  Colonoscopy- 2013. Repeat at 10yrs. DEXA- normal 7-2020  Mammogram- normal 1-2022        Allergies, medications, past medical and surgical history, social history, family history all reviewed.        Review of Systems      Constitutional:  Denies unexplained weight loss/gain or heat/ cold intolerance/loss of balance  ENT: Denies blurred vision, loss of hearing, hoarseness  Cardiovascular:  Denies chest pain, swelling in legs or feet, shortness of breath when lying flat  Respiratory:  Denies shortness of breath, cough greater than 2 weeks or coughing up blood  Gastro: Denies diarrhea greater than 2 weeks, rectal bleeding, bloody stools, heartburn, or constipation  :  Denies blood in urine, nocturia, dysuria or incontinence  Breast:  Denies nipple discharge, masses or pain  Skin:  Denies rash greater than 2 weeks, change in moles  Musculoskeletal/Neuro:  Denies joint pain, muscle weakness, seizures, headaches  Psych:  Denies new onset depression, anxiety, panic attacks  Heme:  Denies easy bruising, bleeding gums, frequent nosebleeds or swollen lymph nodes  GYN:  Denies bleeding or spotting between menses, heavy menses, menses longer than 7 days, pain with sex, severe menstrual cramps, bleeding after sex    Patient referred to a PCP for any significant nongyn findings on ROS. /70   Ht 5' 2.5\" (1.588 m)   Wt 145 lb (65.8 kg)   LMP 01/01/1999   BMI 26.10 kg/m²     Body mass index is 26.1 kg/m². Wt Readings from Last 3 Encounters:   08/15/22 145 lb (65.8 kg)   06/27/22 146 lb (66.2 kg)   06/24/22 142 lb (64.4 kg)         Pt in no distress. Alert and oriented x3. Affect bright. Well developed, well nourished. HEENT: normocephalic, atraumatic. Sclerae nonicteric. Neck supple w/o thyromegaly. Trachea midline. Lungs:  Respiratory effort normal.   Breasts: no masses or axillary lymphadenopathy  Abdomen: soft and nontender, no masses, no organomegaly, no ventral hernia  Pelvic: normal external genitalia, urethral meatus, vagina. Bimanual exam w/o obvious masses or tenderness. Bladder nontender. Uterus and adnexa normal.  Perineum and anus normal. No hemorrhoids. Ephraim Rivera was seen today for annual exam.    Diagnoses and all orders for this visit:    Well woman exam with routine gynecological exam    Encounter for screening mammogram for breast cancer  -     SUYAPA DIGITAL SCREEN W OR WO CAD BILATERAL;  Future    Hot flashes    Chronic night sweats          Pt counseling:   No longer needs paps unless gets new

## 2022-08-15 ENCOUNTER — OFFICE VISIT (OUTPATIENT)
Dept: OBGYN CLINIC | Age: 67
End: 2022-08-15
Payer: MEDICARE

## 2022-08-15 VITALS
HEIGHT: 63 IN | WEIGHT: 145 LBS | BODY MASS INDEX: 25.69 KG/M2 | DIASTOLIC BLOOD PRESSURE: 70 MMHG | SYSTOLIC BLOOD PRESSURE: 136 MMHG

## 2022-08-15 DIAGNOSIS — E78.00 PURE HYPERCHOLESTEROLEMIA: ICD-10-CM

## 2022-08-15 DIAGNOSIS — R61 CHRONIC NIGHT SWEATS: ICD-10-CM

## 2022-08-15 DIAGNOSIS — R23.2 HOT FLASHES: ICD-10-CM

## 2022-08-15 DIAGNOSIS — Z12.31 ENCOUNTER FOR SCREENING MAMMOGRAM FOR BREAST CANCER: ICD-10-CM

## 2022-08-15 DIAGNOSIS — E03.9 PRIMARY HYPOTHYROIDISM: ICD-10-CM

## 2022-08-15 DIAGNOSIS — Z01.419 WELL WOMAN EXAM WITH ROUTINE GYNECOLOGICAL EXAM: Primary | ICD-10-CM

## 2022-08-15 LAB
ALBUMIN SERPL-MCNC: 3.7 G/DL (ref 3.2–4.6)
ALBUMIN/GLOB SERPL: 1.2 {RATIO} (ref 1.2–3.5)
ALP SERPL-CCNC: 78 U/L (ref 50–136)
ALT SERPL-CCNC: 27 U/L (ref 12–65)
ANION GAP SERPL CALC-SCNC: 4 MMOL/L (ref 7–16)
AST SERPL-CCNC: 14 U/L (ref 15–37)
BILIRUB SERPL-MCNC: 0.7 MG/DL (ref 0.2–1.1)
BUN SERPL-MCNC: 10 MG/DL (ref 8–23)
CALCIUM SERPL-MCNC: 9.2 MG/DL (ref 8.3–10.4)
CHLORIDE SERPL-SCNC: 108 MMOL/L (ref 98–107)
CHOLEST SERPL-MCNC: 240 MG/DL
CO2 SERPL-SCNC: 28 MMOL/L (ref 21–32)
CREAT SERPL-MCNC: 0.7 MG/DL (ref 0.6–1)
GLOBULIN SER CALC-MCNC: 3 G/DL (ref 2.3–3.5)
GLUCOSE SERPL-MCNC: 81 MG/DL (ref 65–100)
HDLC SERPL-MCNC: 48 MG/DL (ref 40–60)
HDLC SERPL: 5 {RATIO}
LDLC SERPL CALC-MCNC: 169.8 MG/DL
POTASSIUM SERPL-SCNC: 4.2 MMOL/L (ref 3.5–5.1)
PROT SERPL-MCNC: 6.7 G/DL (ref 6.3–8.2)
SODIUM SERPL-SCNC: 140 MMOL/L (ref 136–145)
TRIGL SERPL-MCNC: 111 MG/DL (ref 35–150)
TSH, 3RD GENERATION: 2.4 UIU/ML (ref 0.36–3.74)
VLDLC SERPL CALC-MCNC: 22.2 MG/DL (ref 6–23)

## 2022-08-15 PROCEDURE — G0101 CA SCREEN;PELVIC/BREAST EXAM: HCPCS | Performed by: OBSTETRICS & GYNECOLOGY

## 2022-08-15 PROCEDURE — G8417 CALC BMI ABV UP PARAM F/U: HCPCS | Performed by: OBSTETRICS & GYNECOLOGY

## 2022-08-15 PROCEDURE — G8427 DOCREV CUR MEDS BY ELIG CLIN: HCPCS | Performed by: OBSTETRICS & GYNECOLOGY

## 2022-08-15 RX ORDER — LORAZEPAM 0.5 MG/1
TABLET ORAL
COMMUNITY
Start: 2022-08-01 | End: 2022-09-27 | Stop reason: SDUPTHER

## 2022-08-23 ENCOUNTER — OFFICE VISIT (OUTPATIENT)
Dept: INTERNAL MEDICINE CLINIC | Facility: CLINIC | Age: 67
End: 2022-08-23
Payer: MEDICARE

## 2022-08-23 VITALS
HEART RATE: 70 BPM | SYSTOLIC BLOOD PRESSURE: 120 MMHG | WEIGHT: 143.4 LBS | OXYGEN SATURATION: 98 % | DIASTOLIC BLOOD PRESSURE: 72 MMHG | TEMPERATURE: 97.6 F | HEIGHT: 63 IN | BODY MASS INDEX: 25.41 KG/M2

## 2022-08-23 DIAGNOSIS — R61 CHRONIC NIGHT SWEATS: Primary | ICD-10-CM

## 2022-08-23 DIAGNOSIS — E78.00 PURE HYPERCHOLESTEROLEMIA: ICD-10-CM

## 2022-08-23 PROCEDURE — 1036F TOBACCO NON-USER: CPT | Performed by: NURSE PRACTITIONER

## 2022-08-23 PROCEDURE — G8399 PT W/DXA RESULTS DOCUMENT: HCPCS | Performed by: NURSE PRACTITIONER

## 2022-08-23 PROCEDURE — 3017F COLORECTAL CA SCREEN DOC REV: CPT | Performed by: NURSE PRACTITIONER

## 2022-08-23 PROCEDURE — G8427 DOCREV CUR MEDS BY ELIG CLIN: HCPCS | Performed by: NURSE PRACTITIONER

## 2022-08-23 PROCEDURE — 99213 OFFICE O/P EST LOW 20 MIN: CPT | Performed by: NURSE PRACTITIONER

## 2022-08-23 PROCEDURE — G8417 CALC BMI ABV UP PARAM F/U: HCPCS | Performed by: NURSE PRACTITIONER

## 2022-08-23 PROCEDURE — 1090F PRES/ABSN URINE INCON ASSESS: CPT | Performed by: NURSE PRACTITIONER

## 2022-08-23 PROCEDURE — 1123F ACP DISCUSS/DSCN MKR DOCD: CPT | Performed by: NURSE PRACTITIONER

## 2022-08-23 RX ORDER — GABAPENTIN 100 MG/1
CAPSULE ORAL
Qty: 90 CAPSULE | Refills: 2 | Status: SHIPPED | OUTPATIENT
Start: 2022-08-23 | End: 2022-09-27

## 2022-08-23 ASSESSMENT — PATIENT HEALTH QUESTIONNAIRE - PHQ9
SUM OF ALL RESPONSES TO PHQ QUESTIONS 1-9: 7
SUM OF ALL RESPONSES TO PHQ9 QUESTIONS 1 & 2: 2
10. IF YOU CHECKED OFF ANY PROBLEMS, HOW DIFFICULT HAVE THESE PROBLEMS MADE IT FOR YOU TO DO YOUR WORK, TAKE CARE OF THINGS AT HOME, OR GET ALONG WITH OTHER PEOPLE: 0
SUM OF ALL RESPONSES TO PHQ QUESTIONS 1-9: 7
7. TROUBLE CONCENTRATING ON THINGS, SUCH AS READING THE NEWSPAPER OR WATCHING TELEVISION: 1
SUM OF ALL RESPONSES TO PHQ QUESTIONS 1-9: 7
6. FEELING BAD ABOUT YOURSELF - OR THAT YOU ARE A FAILURE OR HAVE LET YOURSELF OR YOUR FAMILY DOWN: 1
2. FEELING DOWN, DEPRESSED OR HOPELESS: 1
1. LITTLE INTEREST OR PLEASURE IN DOING THINGS: 1
3. TROUBLE FALLING OR STAYING ASLEEP: 1
4. FEELING TIRED OR HAVING LITTLE ENERGY: 1
SUM OF ALL RESPONSES TO PHQ QUESTIONS 1-9: 7
5. POOR APPETITE OR OVEREATING: 1
9. THOUGHTS THAT YOU WOULD BE BETTER OFF DEAD, OR OF HURTING YOURSELF: 0

## 2022-08-24 RX ORDER — LEVOTHYROXINE SODIUM 0.05 MG/1
TABLET ORAL
Qty: 90 TABLET | Refills: 3 | Status: SHIPPED | OUTPATIENT
Start: 2022-08-24

## 2022-08-24 RX ORDER — LEVOTHYROXINE SODIUM 0.05 MG/1
TABLET ORAL
Qty: 90 TABLET | Refills: 2 | OUTPATIENT
Start: 2022-08-24

## 2022-09-24 PROBLEM — F41.1 GENERALIZED ANXIETY DISORDER: Status: ACTIVE | Noted: 2022-02-04

## 2022-09-24 ASSESSMENT — ENCOUNTER SYMPTOMS
ABDOMINAL PAIN: 0
SHORTNESS OF BREATH: 0
WHEEZING: 0
CHEST TIGHTNESS: 0

## 2022-09-24 NOTE — PROGRESS NOTES
PROGRESS NOTE    SUBJECTIVE:   Damian Cornelius is a 78 y.o. female seen for a follow up visit for   Chief Complaint   Patient presents with    Follow-up Chronic Condition     Anxiety  The history is provided by the patient (See comments). Chronicity: Acute on chronic. Episode onset: Within the last 2 weeks. The problem occurs constantly. The problem has been gradually worsening. Associated symptoms include headaches. Pertinent negatives include no chest pain, no abdominal pain and no shortness of breath. Exacerbated by: See comments. Nothing relieves the symptoms. Thyroid Problem  The history is provided by the patient. This is a chronic problem. Episode onset: 2001. The problem occurs daily. The problem has not changed since onset. Associated symptoms include headaches. Pertinent negatives include no chest pain, no abdominal pain and no shortness of breath. Nothing aggravates the symptoms. Cholesterol Problem  The history is provided by the patient and medical records. This is a chronic problem. Episode onset: many years  The problem occurs constantly. The problem has not changed since onset. Associated symptoms include headaches. Pertinent negatives include no chest pain, no abdominal pain and no shortness of breath. The symptoms are aggravated by eating. Relieved by: diet and exercise. Depression  The history is provided by the patient. This is a chronic problem. Episode onset: since about April/May 2017. The problem occurs constantly. The problem has not changed since onset. Associated symptoms include headaches. Pertinent negatives include no chest pain, no abdominal pain and no shortness of breath. Exacerbated by: Illness of Mother. Nothing relieves the symptoms. She has tried nothing for the symptoms.         Reviewed and updated this visit by provider:  Tobacco  Allergies  Meds  Problems  Med Hx  Surg Hx  Fam Hx         Review of Systems   Constitutional:  Positive for diaphoresis (Night sweats) and fatigue. Negative for fever. Respiratory:  Negative for chest tightness, shortness of breath and wheezing. Cardiovascular:  Negative for chest pain, palpitations and leg swelling. Gastrointestinal:  Negative for abdominal pain. Endocrine: Negative for polydipsia, polyphagia and polyuria. Genitourinary:  Negative for frequency. Musculoskeletal:  Negative for gait problem. Skin:  Negative for rash. Neurological:  Positive for headaches. Negative for weakness and numbness. Psychiatric/Behavioral:  Positive for dysphoric mood. The patient is not nervous/anxious. OBJECTIVE:    /72 (Site: Left Upper Arm, Position: Sitting, Cuff Size: Small Adult)   Pulse 70   Temp 97.6 °F (36.4 °C) (Temporal)   Ht 5' 2.5\" (1.588 m)   Wt 143 lb 6.4 oz (65 kg)   LMP 01/01/1999   SpO2 98%   BMI 25.81 kg/m²      Physical Exam  Vitals and nursing note reviewed. Constitutional:       Appearance: Normal appearance. She is well-groomed. HENT:      Head: Normocephalic. Right Ear: Hearing and external ear normal.      Left Ear: Hearing and external ear normal.      Mouth/Throat:      Lips: Pink. Mouth: Mucous membranes are moist.   Eyes:      General: Lids are normal.      Conjunctiva/sclera: Conjunctivae normal.   Cardiovascular:      Rate and Rhythm: Normal rate and regular rhythm. Pulmonary:      Effort: Pulmonary effort is normal.      Breath sounds: Normal breath sounds. Musculoskeletal:      Right lower leg: No edema. Left lower leg: No edema. Skin:     General: Skin is warm and dry. Findings: No rash. Neurological:      Mental Status: She is alert and oriented to person, place, and time. Cranial Nerves: No dysarthria or facial asymmetry. Motor: Motor function is intact. Gait: Gait is intact.  Gait normal.   Psychiatric:         Attention and Perception: Attention normal.         Mood and Affect: Mood normal.         Behavior: Behavior normal. Behavior is cooperative. ASSESSMENT and PLAN    1. Chronic night sweats  -     gabapentin (NEURONTIN) 100 MG capsule; One capsule by mouth at bedtime for one week then increase to 2 capsules by mouth at bedtime. May titrate up to 3 capsule each night if needed. , Disp-90 capsule, R-2Normal  2. Pure hypercholesterolemia      Return for Follow-Up 6-8 weeks hot flashes, night sweats. the following changes in treatment are made: add gabapentin and titrate if needed. Discussed side effects. . Declines statin at this time. lab results and schedule of future lab studies reviewed with patient  reviewed diet, exercise and weight control  cardiovascular risk and specific lipid/LDL goals reviewed  reviewed medications and side effects in detail      AMI Walls - NATHAN    Dictated using voice recognition software.  Proofread, but unrecognized voice recognition errors may exist.

## 2022-09-27 ENCOUNTER — OFFICE VISIT (OUTPATIENT)
Dept: BEHAVIORAL/MENTAL HEALTH CLINIC | Age: 67
End: 2022-09-27
Payer: MEDICARE

## 2022-09-27 VITALS
TEMPERATURE: 98.1 F | BODY MASS INDEX: 25.34 KG/M2 | SYSTOLIC BLOOD PRESSURE: 110 MMHG | HEIGHT: 63 IN | DIASTOLIC BLOOD PRESSURE: 70 MMHG | OXYGEN SATURATION: 99 % | HEART RATE: 72 BPM | WEIGHT: 143 LBS

## 2022-09-27 DIAGNOSIS — F33.1 MDD (MAJOR DEPRESSIVE DISORDER), RECURRENT EPISODE, MODERATE (HCC): ICD-10-CM

## 2022-09-27 DIAGNOSIS — F41.1 GENERALIZED ANXIETY DISORDER: Primary | ICD-10-CM

## 2022-09-27 PROCEDURE — G8417 CALC BMI ABV UP PARAM F/U: HCPCS | Performed by: NURSE PRACTITIONER

## 2022-09-27 PROCEDURE — 1123F ACP DISCUSS/DSCN MKR DOCD: CPT | Performed by: NURSE PRACTITIONER

## 2022-09-27 PROCEDURE — 3017F COLORECTAL CA SCREEN DOC REV: CPT | Performed by: NURSE PRACTITIONER

## 2022-09-27 PROCEDURE — 1036F TOBACCO NON-USER: CPT | Performed by: NURSE PRACTITIONER

## 2022-09-27 PROCEDURE — 99215 OFFICE O/P EST HI 40 MIN: CPT | Performed by: NURSE PRACTITIONER

## 2022-09-27 PROCEDURE — G8399 PT W/DXA RESULTS DOCUMENT: HCPCS | Performed by: NURSE PRACTITIONER

## 2022-09-27 PROCEDURE — 1090F PRES/ABSN URINE INCON ASSESS: CPT | Performed by: NURSE PRACTITIONER

## 2022-09-27 PROCEDURE — G8427 DOCREV CUR MEDS BY ELIG CLIN: HCPCS | Performed by: NURSE PRACTITIONER

## 2022-09-27 RX ORDER — FLUOXETINE HYDROCHLORIDE 40 MG/1
40 CAPSULE ORAL DAILY
Qty: 30 CAPSULE | Refills: 3 | Status: SHIPPED | OUTPATIENT
Start: 2022-09-27 | End: 2022-10-27

## 2022-09-27 RX ORDER — LORAZEPAM 1 MG/1
1 TABLET ORAL NIGHTLY PRN
Qty: 30 TABLET | Refills: 2 | Status: SHIPPED | OUTPATIENT
Start: 2022-09-27 | End: 2022-10-27

## 2022-09-27 ASSESSMENT — PATIENT HEALTH QUESTIONNAIRE - PHQ9
SUM OF ALL RESPONSES TO PHQ9 QUESTIONS 1 & 2: 5
3. TROUBLE FALLING OR STAYING ASLEEP: 3
10. IF YOU CHECKED OFF ANY PROBLEMS, HOW DIFFICULT HAVE THESE PROBLEMS MADE IT FOR YOU TO DO YOUR WORK, TAKE CARE OF THINGS AT HOME, OR GET ALONG WITH OTHER PEOPLE: 2
SUM OF ALL RESPONSES TO PHQ QUESTIONS 1-9: 17
7. TROUBLE CONCENTRATING ON THINGS, SUCH AS READING THE NEWSPAPER OR WATCHING TELEVISION: 2
4. FEELING TIRED OR HAVING LITTLE ENERGY: 3
9. THOUGHTS THAT YOU WOULD BE BETTER OFF DEAD, OR OF HURTING YOURSELF: 0
5. POOR APPETITE OR OVEREATING: 3
SUM OF ALL RESPONSES TO PHQ QUESTIONS 1-9: 17
8. MOVING OR SPEAKING SO SLOWLY THAT OTHER PEOPLE COULD HAVE NOTICED. OR THE OPPOSITE, BEING SO FIGETY OR RESTLESS THAT YOU HAVE BEEN MOVING AROUND A LOT MORE THAN USUAL: 0
2. FEELING DOWN, DEPRESSED OR HOPELESS: 2
1. LITTLE INTEREST OR PLEASURE IN DOING THINGS: 3
6. FEELING BAD ABOUT YOURSELF - OR THAT YOU ARE A FAILURE OR HAVE LET YOURSELF OR YOUR FAMILY DOWN: 1

## 2022-09-27 NOTE — PROGRESS NOTES
OUTPATIENT PSYCHIATRIC RETURN VISIT PROGRESS NOTE    Date of Service: 9/27/2022     Identification    Shira Quinonez is a 78 y.o.  with a past psychiatric history of depression, JASON  who presents today for a psychiatric follow up appointment. CC:  Routine medication management follow up. Chief Complaint   Patient presents with    Follow-up    Anxiety        Subjective / Interval History:    Pt comes to clinic today and reports that she does not think medications have been effective. (+) depressive symptoms. Feels overwhelmed, anhedonia, anergia, no motivation, isolation, Went to [facility] for testing. Pt has been medication compliant and denies any side-effects. Pt denies SI, HI and AVH. Does not endorse any manic or psychotic symptoms. Psychiatric Review Of Systems:  Sleep: nightime awakenings and difficulty falling back asleep if awakened  Appetite: loss of appetite  but weight gain  Current suicidal/homicidal ideations: Denies SI/ HI  Current auditory/visual hallucinations: Denies     Medications:    Current Outpatient Medications:     levothyroxine (SYNTHROID) 50 MCG tablet, TAKE 1 TABLET BY MOUTH EVERY DAY BEFORE BREAKFAST, Disp: 90 tablet, Rfl: 3    gabapentin (NEURONTIN) 100 MG capsule, One capsule by mouth at bedtime for one week then increase to 2 capsules by mouth at bedtime. May titrate up to 3 capsule each night if needed. , Disp: 90 capsule, Rfl: 2    LORazepam (ATIVAN) 0.5 MG tablet, TAKE 1 TABLET BY MOUTH TWICE A DAY AS NEEDED FOR ANXIETY, Disp: , Rfl:     vitamin D (CHOLECALCIFEROL) 09131 UNIT CAPS, Take 1 capsule by mouth every 7 days, Disp: , Rfl:     estradiol (ESTRACE VAGINAL) 0.1 MG/GM vaginal cream, Place 1 g vaginally daily, Disp: 42.5 g, Rfl: 12       PMH:  Past Medical History:   Diagnosis Date    Anxiety     Atypical facial pain     localized to rt maxill    Chronic pain     neck, back and shoulders from tension    Chronic sinusitis     Depression     Deviated nasal septum     Esophageal reflux 7/18/2016    Eustachian tube dysfunction     GERD (gastroesophageal reflux disease)     History of blood transfusion 1992    History of mycoplasma pneumonia 1998    History of osteomyelitis     h/o osteomylitis of [id number]- still having problems    History of shingles 2020    Hx of chlamydia infection     treated 1994    Hx of Lyme disease 2002    Hypothyroidism due to Hashimoto's thyroiditis 5/11/2017    Migraine     Mild depression (HCC) 4/18/2018    Nausea & vomiting     post op nausea and vomiting    Thyroid disease 2004    Hashimoto's    Vaginitis and vulvovaginitis        Vitals:  Vitals:    09/27/22 1056   BP: 110/70   Pulse: 72   Temp: 98.1 °F (36.7 °C)   SpO2: 99%       ROS:  Psychological ROS: positive for depression    Mental Status Exam:   Appearance  Appears stated age, Well-kept, Appropriately attired, Well-nourished, and Cooperative  Behavior  Cooperative  Psychomotor Activity within normal limits  Gait and Station Normal Josefina and Station and Normal Balance  Speech   appropriate  Mood    is depressed  Affect    depressed  Thought Process Circumstantial  Thought Content/Perceptual Disturbances free of delusions, free of hallucinations, and not internally preoccupied  Cognition/Sensorium Alert, Fully oriented, Normal concentration/ Attention, Recent memory intact, Remote memory intact, and Fund of knowledge adequate for level of education  Insight  fair  Judgment fair  Assessment:    [facility] was seen today for follow-up and anxiety. Diagnoses and all orders for this visit:    Generalized anxiety disorder  -     LORazepam (ATIVAN) 1 MG tablet; Take 1 tablet by mouth nightly as needed for Anxiety for up to 30 days.  -     [facility] Internal Medicine (Counseling)    MDD (major depressive disorder), recurrent episode, moderate ([facility] [address]    Other orders  -     FLUoxetine (PROZAC) 40 MG capsule;  Take 1 capsule by mouth daily Patient Education and Counseling:  Supportive therapy provided for identified psychosocial stressors. Medication education provided and decisions regarding medication regimen discussed with patient. Plan:  -  Increase fluoxetine to 40 mg daily. -  Crisis plan reviewed and patient verbally contracts for safety. Go to ED with emergent symptoms or safety concerns.  -  Risks, benefits, side effects of medications, including any / all black box warnings, discussed with patient, who verbalizes their understanding.     - [employer] web site checked for controlled substances. Disposition:  home    Follow Up:  Return in 7 weeks , or call in the interim for any side-effects, decompensation, questions, or problems between now and the next visit. Return in about 7 weeks (around 11/15/2022) for Follow up. 47 minutes face to face with the patient with more than 50% of the total time spent on education, counseling, & coordination of care of the patient regarding ongoing psychiatric illness.         [address] [facility] [address]

## 2022-10-02 PROBLEM — F33.1 MDD (MAJOR DEPRESSIVE DISORDER), RECURRENT EPISODE, MODERATE (HCC): Status: ACTIVE | Noted: 2022-10-02

## 2022-10-31 ENCOUNTER — OFFICE VISIT (OUTPATIENT)
Dept: BEHAVIORAL/MENTAL HEALTH CLINIC | Facility: CLINIC | Age: 67
End: 2022-10-31

## 2022-10-31 DIAGNOSIS — F33.1 MDD (MAJOR DEPRESSIVE DISORDER), RECURRENT EPISODE, MODERATE (HCC): Primary | ICD-10-CM
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
## 2022-11-17 ENCOUNTER — OFFICE VISIT (OUTPATIENT)
Dept: BEHAVIORAL/MENTAL HEALTH CLINIC | Age: 67
End: 2022-11-17
Payer: MEDICARE

## 2022-11-17 VITALS
SYSTOLIC BLOOD PRESSURE: 130 MMHG | BODY MASS INDEX: 25.69 KG/M2 | OXYGEN SATURATION: 99 % | TEMPERATURE: 98.3 F | DIASTOLIC BLOOD PRESSURE: 74 MMHG | HEIGHT: 63 IN | WEIGHT: 145 LBS | HEART RATE: 66 BPM

## 2022-11-17 DIAGNOSIS — F41.1 GENERALIZED ANXIETY DISORDER: Primary | ICD-10-CM

## 2022-11-17 DIAGNOSIS — F33.1 MDD (MAJOR DEPRESSIVE DISORDER), RECURRENT EPISODE, MODERATE (HCC): ICD-10-CM

## 2022-11-17 PROCEDURE — G8484 FLU IMMUNIZE NO ADMIN: HCPCS | Performed by: NURSE PRACTITIONER

## 2022-11-17 PROCEDURE — 1036F TOBACCO NON-USER: CPT | Performed by: NURSE PRACTITIONER

## 2022-11-17 PROCEDURE — 1123F ACP DISCUSS/DSCN MKR DOCD: CPT | Performed by: NURSE PRACTITIONER

## 2022-11-17 PROCEDURE — G8399 PT W/DXA RESULTS DOCUMENT: HCPCS | Performed by: NURSE PRACTITIONER

## 2022-11-17 PROCEDURE — 1090F PRES/ABSN URINE INCON ASSESS: CPT | Performed by: NURSE PRACTITIONER

## 2022-11-17 PROCEDURE — 99215 OFFICE O/P EST HI 40 MIN: CPT | Performed by: NURSE PRACTITIONER

## 2022-11-17 PROCEDURE — 3017F COLORECTAL CA SCREEN DOC REV: CPT | Performed by: NURSE PRACTITIONER

## 2022-11-17 PROCEDURE — G8427 DOCREV CUR MEDS BY ELIG CLIN: HCPCS | Performed by: NURSE PRACTITIONER

## 2022-11-17 PROCEDURE — G8417 CALC BMI ABV UP PARAM F/U: HCPCS | Performed by: NURSE PRACTITIONER

## 2022-11-17 RX ORDER — FLUOXETINE HYDROCHLORIDE 40 MG/1
40 CAPSULE ORAL DAILY
Qty: 30 CAPSULE | Refills: 3 | Status: SHIPPED | OUTPATIENT
Start: 2022-11-17 | End: 2022-12-17

## 2022-11-17 RX ORDER — LORAZEPAM 1 MG/1
1 TABLET ORAL NIGHTLY PRN
Qty: 30 TABLET | Refills: 2 | Status: SHIPPED | OUTPATIENT
Start: 2022-11-17 | End: 2022-12-17

## 2022-11-17 ASSESSMENT — PATIENT HEALTH QUESTIONNAIRE - PHQ9
SUM OF ALL RESPONSES TO PHQ QUESTIONS 1-9: 2
SUM OF ALL RESPONSES TO PHQ QUESTIONS 1-9: 2
SUM OF ALL RESPONSES TO PHQ9 QUESTIONS 1 & 2: 2
1. LITTLE INTEREST OR PLEASURE IN DOING THINGS: 1
SUM OF ALL RESPONSES TO PHQ QUESTIONS 1-9: 2
2. FEELING DOWN, DEPRESSED OR HOPELESS: 1
SUM OF ALL RESPONSES TO PHQ QUESTIONS 1-9: 2

## 2022-11-17 NOTE — PROGRESS NOTES
OUTPATIENT PSYCHIATRIC RETURN VISIT PROGRESS NOTE    Date of Service: 11/17/2022     Identification    Ewelina Anderson is a 78 y.o.  with a past psychiatric history of depression, anxiety  who presents today for a psychiatric follow up appointment. CC:  Routine medication management follow up. No chief complaint on file. Subjective / Interval History:    Pt comes to clinic today and reports that the increase in fluoxetine to 40 mg daily has been helpful in managing depressive symptoms. Says she still \"puts on a good face\" when doing things with people at [employer]. Wants to honor her commitments but does not feel as passionate as she once did. She has decided to step down from those duties. She has been getting treatment from her alternative medicine provider and is improved re: no longer having hot flashes or night sweats with bio identical HRT. ([facility] [employer]). Had first therapy session with Jeanine Brunson. And will continue. Pt has been medication compliant and denies any side-effects. Pt denies SI, HI and AVH. Does not endorse any manic or psychotic symptoms. Psychiatric Review Of Systems:  Sleep: difficulty falling asleep, nightime awakenings, and difficulty falling back asleep if awakened  Appetite: ok, some decrease  Current suicidal/homicidal ideations: Denies SI/ HI  Current auditory/visual hallucinations: Denies     Medications:    Current Outpatient Medications:     FLUoxetine (PROZAC) 40 MG capsule, Take 1 capsule by mouth daily, Disp: 30 capsule, Rfl: 3    LORazepam (ATIVAN) 1 MG tablet, Take 1 tablet by mouth nightly as needed for Anxiety for up to 30 days. , Disp: 30 tablet, Rfl: 2    levothyroxine (SYNTHROID) 50 MCG tablet, TAKE 1 TABLET BY MOUTH EVERY DAY BEFORE BREAKFAST, Disp: 90 tablet, Rfl: 3    vitamin D (CHOLECALCIFEROL) 83326 UNIT CAPS, Take 1 capsule by mouth every 7 days, Disp: , Rfl:     estradiol (ESTRACE VAGINAL) 0.1 MG/GM vaginal cream, Place 1 g vaginally daily, Disp: 42.5 g, Rfl: 12       PMH:  Past Medical History:   Diagnosis Date    Anxiety     Atypical facial pain     localized to rt maxill    Chronic pain     neck, back and shoulders from tension    Chronic sinusitis     Depression     Deviated nasal septum     Esophageal reflux 7/18/2016    Eustachian tube dysfunction     GERD (gastroesophageal reflux disease)     History of blood transfusion 1992    History of mycoplasma pneumonia 1998    History of osteomyelitis     h/o osteomylitis of [id number]- still having problems    History of shingles 2020    Hx of chlamydia infection     treated 1994    Hx of Lyme disease 2002    Hypothyroidism due to Hashimoto's thyroiditis 5/11/2017    Migraine     Mild depression 4/18/2018    Nausea & vomiting     post op nausea and vomiting    Thyroid disease 2004    Hashimoto's    Vaginitis and vulvovaginitis        Vitals:  Vitals:    11/17/22 1112   BP: 130/74   Pulse: 66   Temp: 98.3 °F (36.8 °C)   SpO2: 99%       ROS:  Psychological ROS: positive for depression    Mental Status Exam:   Appearance  Appears stated age, Well-kept, Appropriately attired, Well-nourished, Cooperative, and Maintains eye-contact  Behavior  Cooperative and Pleasant  Psychomotor Activity within normal limits  Gait and Station Normal Josefina and Station and Normal Balance  Speech   appropriate  Mood    is euthymic  Affect    calm  Thought Process Goal-Directed and Circumstantial  Thought Content/Perceptual Disturbances free of delusions, free of hallucinations, and not internally preoccupied  Cognition/Sensorium Alert, Fully oriented, Normal concentration/ Attention, Recent memory intact, Remote memory intact, and Fund of knowledge adequate for level of education  Insight  good  Judgment good  Assessment:    Diagnoses and all orders for this visit:    Generalized anxiety disorder  -     LORazepam (ATIVAN) 1 MG tablet; Take 1 tablet by mouth nightly as needed for Anxiety for up to 30 days.     MDD (major depressive disorder), recurrent episode, moderate (HCC)    Other orders  -     FLUoxetine (PROZAC) 40 MG capsule; Take 1 capsule by mouth daily       Patient Education and Counseling:  Supportive therapy provided for identified psychosocial stressors. Medication education provided and decisions regarding medication regimen discussed with patient. Plan:  -  Continue fluoxetine 40 mg daily, lorazepam 1 mg HS prn. Continue therapy sessions as planned. -  Crisis plan reviewed and patient verbally contracts for safety. Go to ED with emergent symptoms or safety concerns.  -  Risks, benefits, side effects of medications, including any / all black box warnings, discussed with patient, who verbalizes their understanding.     - [employer] web site checked for controlled substances. Disposition:  home    Follow Up:  Return in 3 months, or call in the interim for any side-effects, decompensation, questions, or problems between now and the next visit. Return in about 3 months (around 2/17/2023) for Follow up. 44 minutes face to face with the patient with more than 50% of the total time spent on education, counseling, & coordination of care of the patient regarding ongoing psychiatric illness.         [address] [facility] [address]

## 2022-12-07 ENCOUNTER — OFFICE VISIT (OUTPATIENT)
Dept: BEHAVIORAL/MENTAL HEALTH CLINIC | Facility: CLINIC | Age: 67
End: 2022-12-07
Payer: MEDICARE

## 2022-12-07 DIAGNOSIS — F33.1 MDD (MAJOR DEPRESSIVE DISORDER), RECURRENT EPISODE, MODERATE (HCC): Primary | ICD-10-CM

## 2022-12-07 DIAGNOSIS — F41.1 GENERALIZED ANXIETY DISORDER: ICD-10-CM

## 2022-12-07 PROCEDURE — 90837 PSYTX W PT 60 MINUTES: CPT | Performed by: COUNSELOR

## 2022-12-07 PROCEDURE — 1123F ACP DISCUSS/DSCN MKR DOCD: CPT | Performed by: COUNSELOR

## 2022-12-07 ASSESSMENT — ANXIETY QUESTIONNAIRES
4. TROUBLE RELAXING: 3
3. WORRYING TOO MUCH ABOUT DIFFERENT THINGS: 1
GAD7 TOTAL SCORE: 13
6. BECOMING EASILY ANNOYED OR IRRITABLE: 3
7. FEELING AFRAID AS IF SOMETHING AWFUL MIGHT HAPPEN: 3
5. BEING SO RESTLESS THAT IT IS HARD TO SIT STILL: 0
IF YOU CHECKED OFF ANY PROBLEMS ON THIS QUESTIONNAIRE, HOW DIFFICULT HAVE THESE PROBLEMS MADE IT FOR YOU TO DO YOUR WORK, TAKE CARE OF THINGS AT HOME, OR GET ALONG WITH OTHER PEOPLE: VERY DIFFICULT
2. NOT BEING ABLE TO STOP OR CONTROL WORRYING: 1
1. FEELING NERVOUS, ANXIOUS, OR ON EDGE: 2

## 2022-12-07 ASSESSMENT — PATIENT HEALTH QUESTIONNAIRE - PHQ9
5. POOR APPETITE OR OVEREATING: 3
9. THOUGHTS THAT YOU WOULD BE BETTER OFF DEAD, OR OF HURTING YOURSELF: 0
SUM OF ALL RESPONSES TO PHQ9 QUESTIONS 1 & 2: 3
3. TROUBLE FALLING OR STAYING ASLEEP: 3
4. FEELING TIRED OR HAVING LITTLE ENERGY: 3
1. LITTLE INTEREST OR PLEASURE IN DOING THINGS: 2
10. IF YOU CHECKED OFF ANY PROBLEMS, HOW DIFFICULT HAVE THESE PROBLEMS MADE IT FOR YOU TO DO YOUR WORK, TAKE CARE OF THINGS AT HOME, OR GET ALONG WITH OTHER PEOPLE: 1
7. TROUBLE CONCENTRATING ON THINGS, SUCH AS READING THE NEWSPAPER OR WATCHING TELEVISION: 2
8. MOVING OR SPEAKING SO SLOWLY THAT OTHER PEOPLE COULD HAVE NOTICED. OR THE OPPOSITE, BEING SO FIGETY OR RESTLESS THAT YOU HAVE BEEN MOVING AROUND A LOT MORE THAN USUAL: 0
2. FEELING DOWN, DEPRESSED OR HOPELESS: 1
SUM OF ALL RESPONSES TO PHQ QUESTIONS 1-9: 17
SUM OF ALL RESPONSES TO PHQ QUESTIONS 1-9: 17
6. FEELING BAD ABOUT YOURSELF - OR THAT YOU ARE A FAILURE OR HAVE LET YOURSELF OR YOUR FAMILY DOWN: 3
SUM OF ALL RESPONSES TO PHQ QUESTIONS 1-9: 17
SUM OF ALL RESPONSES TO PHQ QUESTIONS 1-9: 17

## 2022-12-21 NOTE — PROGRESS NOTES
[facility] Internal Medicine  [address]  [place], [address]    INDIVIDUAL THERAPY NOTE    NAME: Cammy Barger    DATE: 12/07/2022    TYPE OF SERVICE: Individual Therapy    LOCATION OF SERVICE: In Office    TOTAL TIME: 60 minutes    DIAGNOSIS:    1. MDD (major depressive disorder), recurrent episode, moderate ([address]    2. Generalized anxiety disorder      Mental Status EXAM:  Pt appears well nourished,  Pt was appropriately dressed and groomed. Attention span was age appropriate. Insight and judgment were age appropriate. Speech pattern was even. No bizarre or psychotic behaviors were observed. Motor coordination was good. Pt.s eye content was good and manner of relating appeared developmentally within normal range. Mood and Affect: Thought Process: Goal directed and logical    PLAN: TF-CBT may be used to address goals. Pt will decrease her symptoms of depression and anxiety by recognizing cognitive distortions and positively reframing them as evidenced by self-report and lower PHQ and JASON scores. Pt is progressing on goals. Summary of Service: Patient returns for follow-up individual therapy session with provider. Provider administered PHQ-9 and GHAD-7. Pt scored 17 and 13 respectively indicating moderately severe depression and moderate anxiety. Pt was asked to describe the frequency, intensity, duration, and history of her anxiety symptoms, fear, and avoidance. Pt reported significant anxiety due to the amount of clutter and other family member's belongings in her home. Pt reported feeling overwhelmed and engaging in avoidant behavior to manage her stress. Provider and Pt discussed options and ways to communicate her feelings and anxiety to her family members. Pt has some hesitation doing this as some of the things overcrowding her home belong to her daughter who recently went through a divorce and her mother who resides in a assisted care facility.  Pt's  also has a significant amount of items from his private practice as a dentist that is now closed. Provider assisted the Pt with advocating for herself and communicating her needs and feelings to her family. Pt denied sleep and appetite disturbance. Pt denied SI/HI/AVH. Follow Up: Return in about 2 weeks (around 12/21/2022). Aquilino Pretty on 12/20/2022 at 10:17 PM    An electronic signature was used to authenticate this note.

## 2023-01-24 ENCOUNTER — OFFICE VISIT (OUTPATIENT)
Dept: BEHAVIORAL/MENTAL HEALTH CLINIC | Facility: CLINIC | Age: 68
End: 2023-01-24

## 2023-01-24 DIAGNOSIS — F41.1 GENERALIZED ANXIETY DISORDER: ICD-10-CM

## 2023-01-24 DIAGNOSIS — F33.1 MDD (MAJOR DEPRESSIVE DISORDER), RECURRENT EPISODE, MODERATE (HCC): Primary | ICD-10-CM

## 2023-01-25 ENCOUNTER — HOSPITAL ENCOUNTER (OUTPATIENT)
Dept: MAMMOGRAPHY | Age: 68
Discharge: HOME | End: 2023-01-28
Payer: MEDICARE

## 2023-01-25 DIAGNOSIS — Z12.31 ENCOUNTER FOR SCREENING MAMMOGRAM FOR BREAST CANCER: ICD-10-CM

## 2023-01-25 PROCEDURE — 77067 SCR MAMMO BI INCL CAD: CPT

## 2023-02-05 NOTE — PROGRESS NOTES
[facility] Internal Medicine  [address]  [facility], [address]    INDIVIDUAL THERAPY NOTE    NAME: Dc Jaramillo    DATE: 1/24/2023    TYPE OF SERVICE: Individual Therapy    LOCATION OF SERVICE: In Office    TOTAL TIME: 60 minutes    DIAGNOSIS:    1. MDD (major depressive disorder), recurrent episode, moderate ([address]    2. Generalized anxiety disorder      Mental Status EXAM:  Pt appears well nourished,  Pt was appropriately dressed and groomed. Attention span was age appropriate. Insight and judgment were age appropriate. Speech pattern was even. No bizarre or psychotic behaviors were observed. Motor coordination was good. Pt.s eye content was good and manner of relating appeared developmentally within normal range. Mood and Affect: Euthymic with congruent affect    Thought Process: Goal-directed and logical    PLAN: CBT may be used to address goals. Pt is progressing on goals. Pt will decrease her symptoms of depression and anxiety by recognizing cognitive distortions and positively reframing them as evidenced by self-report and lower PHQ and JASON scores. Summary of Service: Patient returns for follow-up individual therapy session with provider. Pt reported improvement in symptoms since last session. Pt reported that her  has recently retired and is spending more time at home. Pt is feeling less overwhelmed and feeling more encouraged and determined about clearing out her home. Provider and pt discussed ways to continue to improve in managing/decreasing the amount of others belongings in her home. Pt was encouraged to continue setting boundaries with family regarding her need to de-clutter her home and create a safe space for herself. Follow Up: Return in about 2 weeks (around 2/7/2023). Joretta Schirmer on 2/5/2023 at 1:24 PM    An electronic signature was used to authenticate this note.

## 2023-02-24 ENCOUNTER — OFFICE VISIT (OUTPATIENT)
Dept: BEHAVIORAL/MENTAL HEALTH CLINIC | Age: 68
End: 2023-02-24
Payer: MEDICARE

## 2023-02-24 VITALS
DIASTOLIC BLOOD PRESSURE: 60 MMHG | HEIGHT: 63 IN | BODY MASS INDEX: 25.34 KG/M2 | HEART RATE: 82 BPM | OXYGEN SATURATION: 99 % | WEIGHT: 143 LBS | SYSTOLIC BLOOD PRESSURE: 118 MMHG

## 2023-02-24 DIAGNOSIS — F51.04 PSYCHOPHYSIOLOGICAL INSOMNIA: ICD-10-CM

## 2023-02-24 DIAGNOSIS — F41.1 GENERALIZED ANXIETY DISORDER: Primary | ICD-10-CM

## 2023-02-24 DIAGNOSIS — F33.1 MDD (MAJOR DEPRESSIVE DISORDER), RECURRENT EPISODE, MODERATE (HCC): ICD-10-CM

## 2023-02-24 PROCEDURE — 3017F COLORECTAL CA SCREEN DOC REV: CPT | Performed by: NURSE PRACTITIONER

## 2023-02-24 PROCEDURE — G8417 CALC BMI ABV UP PARAM F/U: HCPCS | Performed by: NURSE PRACTITIONER

## 2023-02-24 PROCEDURE — G8427 DOCREV CUR MEDS BY ELIG CLIN: HCPCS | Performed by: NURSE PRACTITIONER

## 2023-02-24 PROCEDURE — 1036F TOBACCO NON-USER: CPT | Performed by: NURSE PRACTITIONER

## 2023-02-24 PROCEDURE — 99215 OFFICE O/P EST HI 40 MIN: CPT | Performed by: NURSE PRACTITIONER

## 2023-02-24 PROCEDURE — 1090F PRES/ABSN URINE INCON ASSESS: CPT | Performed by: NURSE PRACTITIONER

## 2023-02-24 PROCEDURE — G8399 PT W/DXA RESULTS DOCUMENT: HCPCS | Performed by: NURSE PRACTITIONER

## 2023-02-24 PROCEDURE — 1123F ACP DISCUSS/DSCN MKR DOCD: CPT | Performed by: NURSE PRACTITIONER

## 2023-02-24 PROCEDURE — G8484 FLU IMMUNIZE NO ADMIN: HCPCS | Performed by: NURSE PRACTITIONER

## 2023-02-24 RX ORDER — LIOTHYRONINE SODIUM 5 UG/1
TABLET ORAL
COMMUNITY
Start: 2022-12-24

## 2023-02-24 RX ORDER — FLUOXETINE HYDROCHLORIDE 40 MG/1
40 CAPSULE ORAL DAILY
Qty: 90 CAPSULE | Refills: 1 | Status: SHIPPED | OUTPATIENT
Start: 2023-02-24 | End: 2023-05-25

## 2023-02-24 RX ORDER — LORAZEPAM 1 MG/1
1 TABLET ORAL NIGHTLY PRN
Qty: 30 TABLET | Refills: 2 | Status: SHIPPED | OUTPATIENT
Start: 2023-02-24 | End: 2023-03-26

## 2023-02-24 RX ORDER — TRAZODONE HYDROCHLORIDE 100 MG/1
100 TABLET ORAL NIGHTLY PRN
Qty: 30 TABLET | Refills: 3 | Status: SHIPPED | OUTPATIENT
Start: 2023-02-24

## 2023-02-24 ASSESSMENT — PATIENT HEALTH QUESTIONNAIRE - PHQ9
6. FEELING BAD ABOUT YOURSELF - OR THAT YOU ARE A FAILURE OR HAVE LET YOURSELF OR YOUR FAMILY DOWN: 1
SUM OF ALL RESPONSES TO PHQ QUESTIONS 1-9: 17
9. THOUGHTS THAT YOU WOULD BE BETTER OFF DEAD, OR OF HURTING YOURSELF: 0
SUM OF ALL RESPONSES TO PHQ QUESTIONS 1-9: 17
7. TROUBLE CONCENTRATING ON THINGS, SUCH AS READING THE NEWSPAPER OR WATCHING TELEVISION: 2
SUM OF ALL RESPONSES TO PHQ9 QUESTIONS 1 & 2: 6
3. TROUBLE FALLING OR STAYING ASLEEP: 3
2. FEELING DOWN, DEPRESSED OR HOPELESS: 3
5. POOR APPETITE OR OVEREATING: 2
4. FEELING TIRED OR HAVING LITTLE ENERGY: 3
10. IF YOU CHECKED OFF ANY PROBLEMS, HOW DIFFICULT HAVE THESE PROBLEMS MADE IT FOR YOU TO DO YOUR WORK, TAKE CARE OF THINGS AT HOME, OR GET ALONG WITH OTHER PEOPLE: 1
8. MOVING OR SPEAKING SO SLOWLY THAT OTHER PEOPLE COULD HAVE NOTICED. OR THE OPPOSITE, BEING SO FIGETY OR RESTLESS THAT YOU HAVE BEEN MOVING AROUND A LOT MORE THAN USUAL: 0
1. LITTLE INTEREST OR PLEASURE IN DOING THINGS: 3
SUM OF ALL RESPONSES TO PHQ QUESTIONS 1-9: 17
SUM OF ALL RESPONSES TO PHQ QUESTIONS 1-9: 17

## 2023-02-24 NOTE — PROGRESS NOTES
OUTPATIENT PSYCHIATRIC RETURN VISIT PROGRESS NOTE    Date of Service: 2/24/2023     Identification    Itz Valdivia is a 75 y.o.  with a past psychiatric history of depression, anxiety  who presents today for a psychiatric follow up appointment. CC:  Routine medication management follow up. Chief Complaint   Patient presents with    Follow-up        Subjective / Interval History:    Pt comes to clinic today and reports that she has some difficulty disengaging  from her commitments with her [employer] . 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 She continues in therapy with Leela Pettit. She continues with her complimentary-alternative medicine provider for bio identical HRT which she says has been very helpful. Mood has been stable. Fluoxetine effective for depression/anxiety. PRN lorazepam effective for anxiety. Reports difficulty sleeping. OTC remedies ineffective. Will begin Trazodone 100 mg HS prn. Pt has been medication compliant and denies any side-effects. Pt denies SI, HI and AVH. Does not endorse any manic or psychotic symptoms.     Psychiatric Review Of Systems:  Sleep: difficulty falling asleep, nightime awakenings, and difficulty falling back asleep if awakened  Appetite: ok  Current suicidal/homicidal ideations: Denies SI/ HI  Current auditory/visual hallucinations: Denies     Medications:    Current Outpatient Medications:     liothyronine (CYTOMEL) 5 MCG tablet, TAKE 1 TABLET BY MOUTH EVERY MORNING ON AN EMPTY STOMACH, Disp: , Rfl:     levothyroxine (SYNTHROID) 50 MCG tablet, TAKE 1 TABLET BY MOUTH EVERY DAY BEFORE BREAKFAST, Disp: 90 tablet, Rfl: 3    vitamin D (CHOLECALCIFEROL) 06765 UNIT CAPS, Take 1 capsule by mouth every 7 days, Disp: , Rfl:     estradiol (ESTRACE VAGINAL) 0.1 MG/GM vaginal cream, Place 1 g vaginally daily, Disp: 42.5 g, Rfl: 12    FLUoxetine (PROZAC) 40 MG capsule, Take 1 capsule by mouth daily, Disp: 30 capsule, Rfl: 3    LORazepam (ATIVAN) 1 MG tablet, Take 1 tablet by mouth nightly as needed for Anxiety for up to 30 days. , Disp: 30 tablet, Rfl: 2       PMH:  Past Medical History:   Diagnosis Date    Anxiety     Atypical facial pain     localized to rt maxill    Chronic pain     neck, back and shoulders from tension    Chronic sinusitis     Depression     Deviated nasal septum     Esophageal reflux 7/18/2016    Eustachian tube dysfunction     GERD (gastroesophageal reflux disease)     History of blood transfusion 1992    History of mycoplasma pneumonia 1998    History of osteomyelitis     h/o osteomylitis of [id number]- still having problems    History of shingles 2020    Hx of chlamydia infection     treated 1994    Hx of Lyme disease 2002    Hypothyroidism due to Hashimoto's thyroiditis 5/11/2017    Migraine     Mild depression 4/18/2018    Nausea & vomiting     post op nausea and vomiting    Thyroid disease 2004    Hashimoto's    Vaginitis and vulvovaginitis        Vitals:  Vitals:    02/24/23 0812   BP: 118/60   Pulse: 82   SpO2: 99%       ROS:  Psychological ROS: positive for sleep disturbance    Mental Status Exam:   Appearance  Appears stated age, Well-kept, Appropriately attired, Well-nourished, Cooperative, and Maintains eye-contact  Behavior  Cooperative and Pleasant  Psychomotor Activity within normal limits  Gait and Station Normal Josefina and Station and Normal Balance  Speech   appropriate  Mood    is euthymic  Affect    calm  Thought Process Goal-Directed and Circumstantial  Thought Content/Perceptual Disturbances free of delusions, free of hallucinations, and not internally preoccupied  Cognition/Sensorium Alert, Fully oriented, Normal concentration/ Attention, Recent memory intact, Remote memory intact, and Fund of knowledge adequate for level of education  Insight  good  Judgment good  Assessment:    Marbinmaryam Sarahi was seen today for follow-up.     Diagnoses and all orders for this visit:    Generalized anxiety disorder  -     LORazepam (ATIVAN) 1 MG tablet; Take 1 tablet by mouth nightly as needed for Anxiety for up to 30 days. MDD (major depressive disorder), recurrent episode, moderate (HCC)    Psychophysiological insomnia    Other orders  -     traZODone (DESYREL) 100 MG tablet; Take 1 tablet by mouth nightly as needed for Sleep  -     FLUoxetine (PROZAC) 40 MG capsule; Take 1 capsule by mouth daily       Patient Education and Counseling:  Supportive therapy provided for identified psychosocial stressors. Medication education provided and decisions regarding medication regimen discussed with patient. Plan:  -  Continue fluoxetine, prn lorazepam. Start Trazodone 100 mg HS prn. Continue therapy sessions as planned. -  Crisis plan reviewed and patient verbally contracts for safety. Go to ED with emergent symptoms or safety concerns.  -  Risks, benefits, side effects of medications, including any / all black box warnings, discussed with patient, who verbalizes their understanding.     - [employer] web site checked for controlled substances. Disposition:  home    Follow Up:  Return in 3 months, or call in the interim for any side-effects, decompensation, questions, or problems between now and the next visit. Return in about 3 months (around 5/22/2023) for Follow up. 47 minutes face to face with the patient with more than 50% of the total time spent on education, counseling, & coordination of care of the patient regarding ongoing psychiatric illness.         [address] [facility] [address]

## 2023-02-27 PROBLEM — F51.04 PSYCHOPHYSIOLOGICAL INSOMNIA: Status: ACTIVE | Noted: 2023-02-27

## 2023-02-28 ENCOUNTER — HOSPITAL ENCOUNTER (EMERGENCY)
Age: 68
Discharge: HOME | End: 2023-02-28
Attending: EMERGENCY MEDICINE
Payer: MEDICARE

## 2023-02-28 VITALS
BODY MASS INDEX: 25.34 KG/M2 | HEART RATE: 55 BPM | SYSTOLIC BLOOD PRESSURE: 130 MMHG | WEIGHT: 143 LBS | HEIGHT: 63 IN | RESPIRATION RATE: 16 BRPM | OXYGEN SATURATION: 100 % | TEMPERATURE: 98.1 F | DIASTOLIC BLOOD PRESSURE: 71 MMHG

## 2023-02-28 DIAGNOSIS — R30.0 DYSURIA: Primary | ICD-10-CM

## 2023-02-28 LAB
ALBUMIN SERPL-MCNC: 4.4 G/DL (ref 3.2–4.6)
ALBUMIN/GLOB SERPL: 2 (ref 0.4–1.6)
ALP SERPL-CCNC: 55 U/L (ref 45–117)
ALT SERPL-CCNC: 13 U/L (ref 13–61)
ANION GAP SERPL CALC-SCNC: 9 MMOL/L (ref 2–11)
APPEARANCE UR: CLEAR
AST SERPL-CCNC: 16 U/L (ref 15–37)
BASOPHILS # BLD: 0 K/UL (ref 0–0.2)
BASOPHILS NFR BLD: 1 % (ref 0–2)
BILIRUB SERPL-MCNC: 0.6 MG/DL (ref 0.2–1.1)
BILIRUB UR QL: NEGATIVE
BUN SERPL-MCNC: 8 MG/DL (ref 7–18)
CALCIUM SERPL-MCNC: 9.2 MG/DL (ref 8.3–10.4)
CHLORIDE SERPL-SCNC: 106 MMOL/L (ref 98–107)
CO2 SERPL-SCNC: 26 MMOL/L (ref 21–32)
COLOR UR: YELLOW
CREAT SERPL-MCNC: 0.69 MG/DL (ref 0.6–1)
DIFFERENTIAL METHOD BLD: ABNORMAL
EOSINOPHIL # BLD: 0.3 K/UL (ref 0–0.8)
EOSINOPHIL NFR BLD: 6 % (ref 0.5–7.8)
ERYTHROCYTE [DISTWIDTH] IN BLOOD BY AUTOMATED COUNT: 12 % (ref 11.9–14.6)
GLOBULIN SER CALC-MCNC: 2.2 G/DL (ref 2.8–4.5)
GLUCOSE SERPL-MCNC: 85 MG/DL (ref 65–100)
GLUCOSE UR STRIP.AUTO-MCNC: NEGATIVE MG/DL
HCT VFR BLD AUTO: 36.2 % (ref 35.8–46.3)
HGB BLD-MCNC: 12.7 G/DL (ref 11.7–15.4)
HGB UR QL STRIP: NEGATIVE
IMM GRANULOCYTES # BLD AUTO: 0 K/UL (ref 0–0.5)
IMM GRANULOCYTES NFR BLD AUTO: 0 % (ref 0–5)
KETONES UR QL STRIP.AUTO: NEGATIVE MG/DL
LEUKOCYTE ESTERASE UR QL STRIP.AUTO: NEGATIVE
LYMPHOCYTES # BLD: 1.7 K/UL (ref 0.5–4.6)
LYMPHOCYTES NFR BLD: 36 % (ref 13–44)
MAGNESIUM SERPL-MCNC: 1.8 MG/DL (ref 1.2–2.6)
MCH RBC QN AUTO: 31.7 PG (ref 26.1–32.9)
MCHC RBC AUTO-ENTMCNC: 35.1 G/DL (ref 31.4–35)
MCV RBC AUTO: 90.3 FL (ref 82–102)
MONOCYTES # BLD: 0.4 K/UL (ref 0.1–1.3)
MONOCYTES NFR BLD: 8 % (ref 4–12)
NEUTS SEG # BLD: 2.3 K/UL (ref 1.7–8.2)
NEUTS SEG NFR BLD: 49 % (ref 43–78)
NITRITE UR QL STRIP.AUTO: NEGATIVE
NRBC # BLD: 0 K/UL (ref 0–0.2)
PH UR STRIP: 5.5 (ref 5–9)
PLATELET # BLD AUTO: 277 K/UL (ref 150–450)
PMV BLD AUTO: 10.1 FL (ref 9.4–12.3)
POTASSIUM SERPL-SCNC: 4.1 MMOL/L (ref 3.5–5.1)
PROT SERPL-MCNC: 6.6 G/DL (ref 6.4–8.2)
PROT UR STRIP-MCNC: NEGATIVE MG/DL
RBC # BLD AUTO: 4.01 M/UL (ref 4.05–5.2)
SODIUM SERPL-SCNC: 141 MMOL/L (ref 133–143)
SP GR UR REFRACTOMETRY: 1.01 (ref 1–1.02)
TSH, 3RD GENERATION: 0.87 UIU/ML (ref 0.58–3.7)
UROBILINOGEN UR QL STRIP.AUTO: 0.2 EU/DL (ref 0.2–1)
WBC # BLD AUTO: 4.7 K/UL (ref 4.3–11.1)

## 2023-02-28 PROCEDURE — 81003 URINALYSIS AUTO W/O SCOPE: CPT

## 2023-02-28 PROCEDURE — 85025 COMPLETE CBC W/AUTO DIFF WBC: CPT

## 2023-02-28 PROCEDURE — 87086 URINE CULTURE/COLONY COUNT: CPT

## 2023-02-28 PROCEDURE — 83735 ASSAY OF MAGNESIUM: CPT

## 2023-02-28 PROCEDURE — 2580000003 HC RX 258: Performed by: PHYSICIAN ASSISTANT

## 2023-02-28 PROCEDURE — 99284 EMERGENCY DEPT VISIT MOD MDM: CPT

## 2023-02-28 PROCEDURE — 84443 ASSAY THYROID STIM HORMONE: CPT

## 2023-02-28 PROCEDURE — 80053 COMPREHEN METABOLIC PANEL: CPT

## 2023-02-28 RX ORDER — ASPIRIN 81 MG/1
81 TABLET ORAL NIGHTLY
COMMUNITY

## 2023-02-28 RX ORDER — PROGESTERONE 200 MG/1
200 CAPSULE ORAL NIGHTLY
COMMUNITY

## 2023-02-28 RX ORDER — 0.9 % SODIUM CHLORIDE 0.9 %
1000 INTRAVENOUS SOLUTION INTRAVENOUS
Status: COMPLETED | OUTPATIENT
Start: 2023-02-28 | End: 2023-02-28

## 2023-02-28 RX ADMIN — SODIUM CHLORIDE 1000 ML: 9 INJECTION, SOLUTION INTRAVENOUS at 12:05

## 2023-02-28 ASSESSMENT — ENCOUNTER SYMPTOMS
NAUSEA: 0
SORE THROAT: 0
ABDOMINAL PAIN: 0
COUGH: 0
VOMITING: 0
BACK PAIN: 0
SHORTNESS OF BREATH: 0

## 2023-02-28 ASSESSMENT — PAIN SCALES - GENERAL
PAINLEVEL_OUTOF10: 6
PAINLEVEL_OUTOF10: 7

## 2023-02-28 ASSESSMENT — PAIN - FUNCTIONAL ASSESSMENT: PAIN_FUNCTIONAL_ASSESSMENT: 0-10

## 2023-02-28 NOTE — ED PROVIDER NOTES
Emergency Department Provider Note                   PCP:                AMI Mora NP               Age: 75 y.o. Sex: female       ICD-10-CM    1. Dysuria  R30.0           DISPOSITION Decision To Discharge 02/28/2023 12:49:03 PM       Karolina Dahl is a 75 y.o. female who presents to the Emergency Department with chief complaint of    Chief Complaint   Patient presents with    Dysuria      58-year-old female who presents this department with chief complaint of 1 day history of burning with urination and urinary frequency. She reports she did an AZO test at home that resulted positive for nitrates. Furthermore, she endorses some generalized weakness and poor p.o. intake associate with mild nausea. She feels as if she might have a UTI. She has not use any other medications with the exception of AZO for improvement of her symptoms. The history is provided by the patient. No  was used. Review of Systems   Constitutional:  Negative for chills and fever. HENT:  Negative for congestion and sore throat. Respiratory:  Negative for cough and shortness of breath. Cardiovascular:  Negative for chest pain and palpitations. Gastrointestinal:  Negative for abdominal pain, nausea and vomiting. Genitourinary:  Positive for frequency and urgency. Negative for dysuria and vaginal discharge. Musculoskeletal:  Negative for back pain. Skin:  Negative for wound. Neurological:  Positive for weakness. Psychiatric/Behavioral:  Negative for agitation. All other systems reviewed and are negative.     Past Medical History:   Diagnosis Date    Anxiety     Atypical facial pain     localized to rt maxill    Chronic pain     neck, back and shoulders from tension    Chronic sinusitis     Depression     Deviated nasal septum     Esophageal reflux 7/18/2016    Eustachian tube dysfunction     GERD (gastroesophageal reflux disease)     History of blood transfusion 1992 History of mycoplasma pneumonia 1998    History of osteomyelitis     h/o osteomylitis of [id number]- still having problems    History of shingles 2020    Hx of chlamydia infection     treated 1994    Hx of Lyme disease 2002    Hypothyroidism due to Hashimoto's thyroiditis 5/11/2017    Migraine     Mild depression 4/18/2018    Nausea & vomiting     post op nausea and vomiting    Thyroid disease 2004    Hashimoto's    Vaginitis and vulvovaginitis         Past Surgical History:   Procedure Laterality Date    BLADDER SUSPENSION  03/02/2021    BREAST BIOPSY Left 1992, 1999, 2002    benign mass    BREAST RECONSTRUCTION Bilateral 1992    BREAST REDUCTION SURGERY Bilateral [facility]    reconstruction    BREAST SURGERY  [id number]    left breast x3    COLONOSCOPY  2013    GYN  1981    Tubal Ligation    HEENT  1964    tonsilectomy    HEENT  4/13/05    SEPTO/FESS    HEENT      jaw surg r/t osteomylitits-- 2004    INCONTINENCE SURGERY      2021    OTHER SURGICAL HISTORY Bilateral 06/7/05    Tube Placement    REFRACTIVE SURGERY  2001    JESSIE AND BSO (CERVIX REMOVED)  1999    Hysterectomy & BSO        Family History   Problem Relation Age of Onset    Parkinson's Disease Father     Hypertension Father     Diabetes Father     Heart Disease Father         myocardial infarction    Cancer Father         bladder    Other Father         bladder cancer, parkinson's    Glaucoma Father     Breast Cancer Mother 80    Hypertension Mother     Other Mother     Post-op Nausea/Vomiting Mother     Stroke Mother     Breast Cancer Paternal Aunt     Hypertension Paternal Grandmother     Breast Cancer Maternal Grandmother     Diabetes Maternal Uncle     Colon Cancer Maternal Aunt     Hypertension Daughter     No Known Problems Brother     Stroke Paternal Grandfather     Hypertension Sister     Stroke Sister     Post-op Cognitive Dysfunction Neg Hx     Emergence Delirium Neg Hx     Delayed Awakening Neg Hx     Pseudochol.  Deficiency Neg Hx     Malig Hypertherm Neg Hx         Social History     Socioeconomic History    Marital status:      Spouse name: None    Number of children: None    Years of education: None    Highest education level: None   Tobacco Use    Smoking status: Never    Smokeless tobacco: Never   Substance and Sexual Activity    Alcohol use: No    Drug use: No    Sexual activity: Yes     Partners: Male     Birth control/protection: Surgical     Comment: hysterectomy        Allergies: Latex, Adhesive tape, Hydrocodone-acetaminophen, and Minocycline    Discharge Medication List as of 2/28/2023 12:52 PM        CONTINUE these medications which have NOT CHANGED    Details   aspirin 81 MG EC tablet Take 81 mg by mouth at bedtimeHistorical Med      progesterone (PROMETRIUM) 200 MG CAPS capsule Take 200 mg by mouth at bedtimeHistorical Med      liothyronine (CYTOMEL) 5 MCG tablet TAKE 1 TABLET BY MOUTH EVERY MORNING ON AN EMPTY STOMACHHistorical Med      traZODone (DESYREL) 100 MG tablet Take 1 tablet by mouth nightly as needed for Sleep, Disp-30 tablet, R-3Normal      LORazepam (ATIVAN) 1 MG tablet Take 1 tablet by mouth nightly as needed for Anxiety for up to 30 days. , Disp-30 tablet, R-2Normal      FLUoxetine (PROZAC) 40 MG capsule Take 1 capsule by mouth daily, Disp-90 capsule, R-1Normal      levothyroxine (SYNTHROID) 50 MCG tablet TAKE 1 TABLET BY MOUTH EVERY DAY BEFORE BREAKFAST, Disp-90 tablet, R-3Normal      vitamin D (CHOLECALCIFEROL) 23159 UNIT CAPS Take 1 capsule by mouth every 7 daysHistorical Med      estradiol (ESTRACE VAGINAL) 0.1 MG/GM vaginal cream Place 1 g vaginally daily, Disp-42.5 g, R-12Normal              Vitals signs and nursing note reviewed. Patient Vitals for the past 4 hrs:   Pulse Resp BP SpO2   02/28/23 1255 55 16 130/71 100 %          Physical Exam  Vitals and nursing note reviewed. Constitutional:       General: She is not in acute distress. Appearance: Normal appearance.  She is not ill-appearing, toxic-appearing or diaphoretic. HENT:      Head: Normocephalic and atraumatic. Nose: Nose normal.      Mouth/Throat:      Mouth: Mucous membranes are moist.   Eyes:      Pupils: Pupils are equal, round, and reactive to light. Cardiovascular:      Rate and Rhythm: Normal rate. Pulmonary:      Effort: Pulmonary effort is normal. No respiratory distress. Abdominal:      General: Abdomen is flat. Palpations: Abdomen is soft. Tenderness: There is no abdominal tenderness. Comments: Mild suprapubic tenderness   Musculoskeletal:         General: Normal range of motion. Cervical back: Normal range of motion. No rigidity. Skin:     General: Skin is warm. Neurological:      General: No focal deficit present. Mental Status: She is alert. Psychiatric:         Mood and Affect: Mood normal.        Procedures    Medical Decision Making  69-year-old female presenting here with some urinary discomfort of several days duration. She had concern for UTI after she had a home AZO UTI test and tested positive for nitrates. She presented here for evaluation. Coincidentally, her urine was clean here without any signs of white blood cells, leukocyte esterase or nitrates. At that point we initiated broad-spectrum work-up including CMP, CBC, TSH and magnesium. All of these labs were all within normal limits without any acute abnormalities. I discussed these findings with patient's, through shared decision-making, we elected to forego any further testing such as CT imaging which was considered however decided to forego. Instead, patient elects to watchful wait and see if her symptoms improve over the next several days. I advised her that while her urine was clean without signs of infection, we will send for culture. If it grows out any bacteria that needs to be treated, she will be called by provider from this department.   Patient is amenable to this course of treatment and is appreciative of care plan. Problems Addressed:  Dysuria: complicated acute illness or injury    Amount and/or Complexity of Data Reviewed  Labs: ordered. Details: nml    Risk  Prescription drug management. Complexity of Problem: 1 stable, acute illness. (3)  Considerations: The following labs and/or imaging studies were considered but not ordered: ct abd  We discussed care recommended by provider that patient declined (tests, disposition, etc). Patient was discharged risks and benefits of hospitalization were considered. Orders Placed This Encounter   Procedures    Urinalysis w rflx microscopic    CBC with Auto Differential    Comprehensive Metabolic Panel    Magnesium    TSH        Medications   0.9 % sodium chloride bolus (0 mLs IntraVENous Stopped 2/28/23 1252)       Discharge Medication List as of 2/28/2023 12:52 PM                  Voice dictation software was used during the making of this note. This software is not perfect and grammatical and other typographical errors may be present. This note has not been completely proofread for errors.      Rosita Carnes  02/28/23 4469

## 2023-02-28 NOTE — DISCHARGE INSTRUCTIONS
All of your labs looked good. Coincidentally, your urine did not show signs of infection here in our department. We will send your urine for culture, if it grows out any bacteria that need to be treated, you will be contacted by a provider from this emergency department to discuss with you and prescribe an antibiotic. If worsening or worrisome symptoms develop, return here for reevaluation, otherwise follow-up with your primary care physician.

## 2023-02-28 NOTE — ED TRIAGE NOTES
Ambulatory  to triage. Pt reports left sided lower back with dysuria since yesterday. AZO at home positive for nitrate.  Pt also reports weakness and poor PO/nausea

## 2023-02-28 NOTE — ED NOTES
I have reviewed discharge instructions with the patient. The patient verbalized understanding. Patient left ED via Discharge Method: ambulatory to Home with self    Opportunity for questions and clarification provided. Patient given 0 scripts. To continue your aftercare when you leave the hospital, you may receive an automated call from our care team to check in on how you are doing. This is a free service and part of our promise to provide the best care and service to meet your aftercare needs.  If you have questions, or wish to unsubscribe from this service please call [phone]. Thank you for Choosing our [facility] Emergency Department.         Lilian Yepez RN  02/28/23 1300

## 2023-03-02 LAB
BACTERIA SPEC CULT: NORMAL
SERVICE CMNT-IMP: NORMAL

## 2023-03-06 ENCOUNTER — OFFICE VISIT (OUTPATIENT)
Dept: BEHAVIORAL/MENTAL HEALTH CLINIC | Facility: CLINIC | Age: 68
End: 2023-03-06
Payer: MEDICARE

## 2023-03-06 DIAGNOSIS — F41.1 GENERALIZED ANXIETY DISORDER: Primary | ICD-10-CM

## 2023-03-06 DIAGNOSIS — F33.1 MDD (MAJOR DEPRESSIVE DISORDER), RECURRENT EPISODE, MODERATE (HCC): ICD-10-CM

## 2023-03-06 PROCEDURE — 90837 PSYTX W PT 60 MINUTES: CPT | Performed by: COUNSELOR

## 2023-03-06 PROCEDURE — 1036F TOBACCO NON-USER: CPT | Performed by: COUNSELOR

## 2023-03-06 PROCEDURE — 1123F ACP DISCUSS/DSCN MKR DOCD: CPT | Performed by: COUNSELOR

## 2023-03-06 NOTE — PROGRESS NOTES
[facility] Internal Medicine  [address]  [place], [address]    INDIVIDUAL THERAPY NOTE    NAME: McClain Augusta    DATE: 3/6/2023    TYPE OF SERVICE: Individual Therapy    LOCATION OF SERVICE: In Office    TOTAL TIME: 60 minutes    DIAGNOSIS:    1. Generalized anxiety disorder    2. MDD (major depressive disorder), recurrent episode, moderate (HCC)        Mental Status EXAM:  Pt appears well nourished,  Pt was appropriately dressed and groomed. Attention span was age appropriate. Insight and judgment were age appropriate. Speech pattern was even. No bizarre or psychotic behaviors were observed. Motor coordination was good. Pt.s eye content was good and manner of relating appeared developmentally within normal range. Mood and Affect: Euthymic with congruent affect    Thought Process: Goal-directed and logical    PLAN: CBT may be used to address goals. Pt will decrease her symptoms of depression and anxiety by recognizing cognitive distortions and positively reframing them as evidenced by self-report and lower PHQ and JASON scores. Pt is progressing on goals. Summary of Service: Patient returns for follow-up individual therapy session with provider. Pt reported an increase in stress and anxiety. Provider assisted pt with processing triggers for increased stress and anxiety. Pt is worried because she is expecting a visit from family. Pt continues to struggle with clearing cutter and organizing her home. Pt's oldest grandchild has volunteered to assist the client with clearing out some things. 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 Pt is also going to [place] for a diego event. Pt reported she is unable to discuss how she is stressed her with her  because he gets angry. Pt's  likes that she does a lot of volunteer work in [employer] and for [employer]. Provider validated pt's concerns.  Provider and pt discussed her need for self-care and support from her family to help her manage the clutter in her home. Provider stresses the importance of boundaries and not over committing herself to requests. Pt is planning a couple of trips soon and hopes this will help her relax. Pt also expressed some concerns with intimacy with her  due to ED. Pt's  is refusing to see a doctor for further testing and also has a family history of prostate cancer. Provider gave pt some advice on initiating difficult conversations around sex with her spouse. Follow Up: Return in about 2 weeks (around 3/20/2023). Tonia Emery on 3/6/2023 at 10:03 AM    An electronic signature was used to authenticate this note.

## 2023-04-06 ENCOUNTER — OFFICE VISIT (OUTPATIENT)
Dept: BEHAVIORAL/MENTAL HEALTH CLINIC | Facility: CLINIC | Age: 68
End: 2023-04-06
Payer: MEDICARE

## 2023-04-06 DIAGNOSIS — F41.1 GENERALIZED ANXIETY DISORDER: Primary | ICD-10-CM

## 2023-04-06 PROCEDURE — 1123F ACP DISCUSS/DSCN MKR DOCD: CPT | Performed by: COUNSELOR

## 2023-04-06 PROCEDURE — 90837 PSYTX W PT 60 MINUTES: CPT | Performed by: COUNSELOR

## 2023-04-06 PROCEDURE — 1036F TOBACCO NON-USER: CPT | Performed by: COUNSELOR

## 2023-04-06 ASSESSMENT — ANXIETY QUESTIONNAIRES
3. WORRYING TOO MUCH ABOUT DIFFERENT THINGS: 1
4. TROUBLE RELAXING: 1
5. BEING SO RESTLESS THAT IT IS HARD TO SIT STILL: 1
GAD7 TOTAL SCORE: 7
1. FEELING NERVOUS, ANXIOUS, OR ON EDGE: 1
2. NOT BEING ABLE TO STOP OR CONTROL WORRYING: 1
6. BECOMING EASILY ANNOYED OR IRRITABLE: 1
IF YOU CHECKED OFF ANY PROBLEMS ON THIS QUESTIONNAIRE, HOW DIFFICULT HAVE THESE PROBLEMS MADE IT FOR YOU TO DO YOUR WORK, TAKE CARE OF THINGS AT HOME, OR GET ALONG WITH OTHER PEOPLE: SOMEWHAT DIFFICULT
7. FEELING AFRAID AS IF SOMETHING AWFUL MIGHT HAPPEN: 1

## 2023-04-06 ASSESSMENT — PATIENT HEALTH QUESTIONNAIRE - PHQ9
3. TROUBLE FALLING OR STAYING ASLEEP: 1
9. THOUGHTS THAT YOU WOULD BE BETTER OFF DEAD, OR OF HURTING YOURSELF: 0
7. TROUBLE CONCENTRATING ON THINGS, SUCH AS READING THE NEWSPAPER OR WATCHING TELEVISION: 0
1. LITTLE INTEREST OR PLEASURE IN DOING THINGS: 1
5. POOR APPETITE OR OVEREATING: 2
SUM OF ALL RESPONSES TO PHQ9 QUESTIONS 1 & 2: 2
SUM OF ALL RESPONSES TO PHQ QUESTIONS 1-9: 8
10. IF YOU CHECKED OFF ANY PROBLEMS, HOW DIFFICULT HAVE THESE PROBLEMS MADE IT FOR YOU TO DO YOUR WORK, TAKE CARE OF THINGS AT HOME, OR GET ALONG WITH OTHER PEOPLE: 1
SUM OF ALL RESPONSES TO PHQ QUESTIONS 1-9: 8
4. FEELING TIRED OR HAVING LITTLE ENERGY: 2
SUM OF ALL RESPONSES TO PHQ QUESTIONS 1-9: 8
6. FEELING BAD ABOUT YOURSELF - OR THAT YOU ARE A FAILURE OR HAVE LET YOURSELF OR YOUR FAMILY DOWN: 1
SUM OF ALL RESPONSES TO PHQ QUESTIONS 1-9: 8
2. FEELING DOWN, DEPRESSED OR HOPELESS: 1
8. MOVING OR SPEAKING SO SLOWLY THAT OTHER PEOPLE COULD HAVE NOTICED. OR THE OPPOSITE, BEING SO FIGETY OR RESTLESS THAT YOU HAVE BEEN MOVING AROUND A LOT MORE THAN USUAL: 0

## 2023-04-17 NOTE — PROGRESS NOTES
with identifying and managing negative thoughts and words about herself related to how she perceives the clutter in home. Provider reminded pt about setting boundaries and expectations with relatives about their role contributing/decreasing the clutter. FOLLOW UP: Return in about 3 weeks (around 4/27/2023). Sandy Rossi on 4/17/2023 at 4:51 PM    An electronic signature was used to authenticate this note.

## 2023-06-28 ENCOUNTER — OFFICE VISIT (OUTPATIENT)
Dept: BEHAVIORAL/MENTAL HEALTH CLINIC | Facility: CLINIC | Age: 68
End: 2023-06-28
Payer: MEDICARE

## 2023-06-28 DIAGNOSIS — F33.1 MDD (MAJOR DEPRESSIVE DISORDER), RECURRENT EPISODE, MODERATE (HCC): Primary | ICD-10-CM

## 2023-06-28 PROCEDURE — 90834 PSYTX W PT 45 MINUTES: CPT | Performed by: COUNSELOR

## 2023-06-28 PROCEDURE — 1036F TOBACCO NON-USER: CPT | Performed by: COUNSELOR

## 2023-06-28 PROCEDURE — 1123F ACP DISCUSS/DSCN MKR DOCD: CPT | Performed by: COUNSELOR

## 2023-06-28 ASSESSMENT — ANXIETY QUESTIONNAIRES
4. TROUBLE RELAXING: 3
GAD7 TOTAL SCORE: 17
5. BEING SO RESTLESS THAT IT IS HARD TO SIT STILL: 1
7. FEELING AFRAID AS IF SOMETHING AWFUL MIGHT HAPPEN: 3
6. BECOMING EASILY ANNOYED OR IRRITABLE: 3
3. WORRYING TOO MUCH ABOUT DIFFERENT THINGS: 2
2. NOT BEING ABLE TO STOP OR CONTROL WORRYING: 2
1. FEELING NERVOUS, ANXIOUS, OR ON EDGE: 3
IF YOU CHECKED OFF ANY PROBLEMS ON THIS QUESTIONNAIRE, HOW DIFFICULT HAVE THESE PROBLEMS MADE IT FOR YOU TO DO YOUR WORK, TAKE CARE OF THINGS AT HOME, OR GET ALONG WITH OTHER PEOPLE: VERY DIFFICULT

## 2023-06-28 ASSESSMENT — PATIENT HEALTH QUESTIONNAIRE - PHQ9
SUM OF ALL RESPONSES TO PHQ QUESTIONS 1-9: 18
5. POOR APPETITE OR OVEREATING: 3
4. FEELING TIRED OR HAVING LITTLE ENERGY: 3
SUM OF ALL RESPONSES TO PHQ QUESTIONS 1-9: 18
9. THOUGHTS THAT YOU WOULD BE BETTER OFF DEAD, OR OF HURTING YOURSELF: 1
SUM OF ALL RESPONSES TO PHQ QUESTIONS 1-9: 17
10. IF YOU CHECKED OFF ANY PROBLEMS, HOW DIFFICULT HAVE THESE PROBLEMS MADE IT FOR YOU TO DO YOUR WORK, TAKE CARE OF THINGS AT HOME, OR GET ALONG WITH OTHER PEOPLE: 2
1. LITTLE INTEREST OR PLEASURE IN DOING THINGS: 2
8. MOVING OR SPEAKING SO SLOWLY THAT OTHER PEOPLE COULD HAVE NOTICED. OR THE OPPOSITE, BEING SO FIGETY OR RESTLESS THAT YOU HAVE BEEN MOVING AROUND A LOT MORE THAN USUAL: 0
SUM OF ALL RESPONSES TO PHQ QUESTIONS 1-9: 18
SUM OF ALL RESPONSES TO PHQ9 QUESTIONS 1 & 2: 4
3. TROUBLE FALLING OR STAYING ASLEEP: 3
2. FEELING DOWN, DEPRESSED OR HOPELESS: 2
7. TROUBLE CONCENTRATING ON THINGS, SUCH AS READING THE NEWSPAPER OR WATCHING TELEVISION: 1
6. FEELING BAD ABOUT YOURSELF - OR THAT YOU ARE A FAILURE OR HAVE LET YOURSELF OR YOUR FAMILY DOWN: 3

## 2023-06-28 ASSESSMENT — COLUMBIA-SUICIDE SEVERITY RATING SCALE - C-SSRS
1. WITHIN THE PAST MONTH, HAVE YOU WISHED YOU WERE DEAD OR WISHED YOU COULD GO TO SLEEP AND NOT WAKE UP?: YES
2. HAVE YOU ACTUALLY HAD ANY THOUGHTS OF KILLING YOURSELF?: NO
6. HAVE YOU EVER DONE ANYTHING, STARTED TO DO ANYTHING, OR PREPARED TO DO ANYTHING TO END YOUR LIFE?: NO

## 2023-07-06 ENCOUNTER — OFFICE VISIT (OUTPATIENT)
Dept: BEHAVIORAL/MENTAL HEALTH CLINIC | Age: 68
End: 2023-07-06
Payer: MEDICARE

## 2023-07-06 VITALS
BODY MASS INDEX: 24.98 KG/M2 | WEIGHT: 141 LBS | DIASTOLIC BLOOD PRESSURE: 66 MMHG | OXYGEN SATURATION: 97 % | HEIGHT: 63 IN | HEART RATE: 69 BPM | SYSTOLIC BLOOD PRESSURE: 118 MMHG

## 2023-07-06 DIAGNOSIS — F51.04 PSYCHOPHYSIOLOGICAL INSOMNIA: Primary | ICD-10-CM

## 2023-07-06 DIAGNOSIS — F33.1 MDD (MAJOR DEPRESSIVE DISORDER), RECURRENT EPISODE, MODERATE (HCC): ICD-10-CM

## 2023-07-06 DIAGNOSIS — F41.1 GENERALIZED ANXIETY DISORDER: ICD-10-CM

## 2023-07-06 PROCEDURE — G8399 PT W/DXA RESULTS DOCUMENT: HCPCS | Performed by: NURSE PRACTITIONER

## 2023-07-06 PROCEDURE — 99215 OFFICE O/P EST HI 40 MIN: CPT | Performed by: NURSE PRACTITIONER

## 2023-07-06 PROCEDURE — G8420 CALC BMI NORM PARAMETERS: HCPCS | Performed by: NURSE PRACTITIONER

## 2023-07-06 PROCEDURE — 1090F PRES/ABSN URINE INCON ASSESS: CPT | Performed by: NURSE PRACTITIONER

## 2023-07-06 PROCEDURE — 3017F COLORECTAL CA SCREEN DOC REV: CPT | Performed by: NURSE PRACTITIONER

## 2023-07-06 PROCEDURE — 1123F ACP DISCUSS/DSCN MKR DOCD: CPT | Performed by: NURSE PRACTITIONER

## 2023-07-06 PROCEDURE — 1036F TOBACCO NON-USER: CPT | Performed by: NURSE PRACTITIONER

## 2023-07-06 PROCEDURE — G8427 DOCREV CUR MEDS BY ELIG CLIN: HCPCS | Performed by: NURSE PRACTITIONER

## 2023-07-06 RX ORDER — FLUOXETINE HYDROCHLORIDE 40 MG/1
40 CAPSULE ORAL DAILY
Qty: 90 CAPSULE | Refills: 1 | Status: SHIPPED | OUTPATIENT
Start: 2023-07-06 | End: 2023-10-04

## 2023-07-06 RX ORDER — LORAZEPAM 1 MG/1
1 TABLET ORAL NIGHTLY PRN
Qty: 30 TABLET | Refills: 2 | Status: SHIPPED | OUTPATIENT
Start: 2023-07-06 | End: 2023-08-05

## 2023-07-06 ASSESSMENT — PATIENT HEALTH QUESTIONNAIRE - PHQ9
9. THOUGHTS THAT YOU WOULD BE BETTER OFF DEAD, OR OF HURTING YOURSELF: 0
SUM OF ALL RESPONSES TO PHQ QUESTIONS 1-9: 18
7. TROUBLE CONCENTRATING ON THINGS, SUCH AS READING THE NEWSPAPER OR WATCHING TELEVISION: 3
2. FEELING DOWN, DEPRESSED OR HOPELESS: 2
4. FEELING TIRED OR HAVING LITTLE ENERGY: 3
3. TROUBLE FALLING OR STAYING ASLEEP: 3
SUM OF ALL RESPONSES TO PHQ QUESTIONS 1-9: 18
1. LITTLE INTEREST OR PLEASURE IN DOING THINGS: 1
5. POOR APPETITE OR OVEREATING: 3
10. IF YOU CHECKED OFF ANY PROBLEMS, HOW DIFFICULT HAVE THESE PROBLEMS MADE IT FOR YOU TO DO YOUR WORK, TAKE CARE OF THINGS AT HOME, OR GET ALONG WITH OTHER PEOPLE: 2
6. FEELING BAD ABOUT YOURSELF - OR THAT YOU ARE A FAILURE OR HAVE LET YOURSELF OR YOUR FAMILY DOWN: 3
SUM OF ALL RESPONSES TO PHQ9 QUESTIONS 1 & 2: 3
SUM OF ALL RESPONSES TO PHQ QUESTIONS 1-9: 18
SUM OF ALL RESPONSES TO PHQ QUESTIONS 1-9: 18
8. MOVING OR SPEAKING SO SLOWLY THAT OTHER PEOPLE COULD HAVE NOTICED. OR THE OPPOSITE, BEING SO FIGETY OR RESTLESS THAT YOU HAVE BEEN MOVING AROUND A LOT MORE THAN USUAL: 0

## 2023-07-06 NOTE — PROGRESS NOTES
problems between now and the next visit. Return in about 3 months (around 9/25/2023) for Follow up. 49 minutes face to face with the patient with more than 50% of the total time spent on education, counseling, & coordination of care of the patient regarding ongoing psychiatric illness.         [address]

## 2023-08-09 ENCOUNTER — OFFICE VISIT (OUTPATIENT)
Dept: BEHAVIORAL/MENTAL HEALTH CLINIC | Facility: CLINIC | Age: 68
End: 2023-08-09
Payer: MEDICARE

## 2023-08-09 DIAGNOSIS — F33.1 MDD (MAJOR DEPRESSIVE DISORDER), RECURRENT EPISODE, MODERATE (HCC): Primary | ICD-10-CM

## 2023-08-09 PROCEDURE — 90834 PSYTX W PT 45 MINUTES: CPT | Performed by: COUNSELOR

## 2023-08-09 PROCEDURE — 1036F TOBACCO NON-USER: CPT | Performed by: COUNSELOR

## 2023-08-09 PROCEDURE — 1123F ACP DISCUSS/DSCN MKR DOCD: CPT | Performed by: COUNSELOR

## 2023-08-09 ASSESSMENT — PATIENT HEALTH QUESTIONNAIRE - PHQ9
9. THOUGHTS THAT YOU WOULD BE BETTER OFF DEAD, OR OF HURTING YOURSELF: 0
SUM OF ALL RESPONSES TO PHQ QUESTIONS 1-9: 15
5. POOR APPETITE OR OVEREATING: 2
SUM OF ALL RESPONSES TO PHQ9 QUESTIONS 1 & 2: 2
7. TROUBLE CONCENTRATING ON THINGS, SUCH AS READING THE NEWSPAPER OR WATCHING TELEVISION: 2
10. IF YOU CHECKED OFF ANY PROBLEMS, HOW DIFFICULT HAVE THESE PROBLEMS MADE IT FOR YOU TO DO YOUR WORK, TAKE CARE OF THINGS AT HOME, OR GET ALONG WITH OTHER PEOPLE: 1
8. MOVING OR SPEAKING SO SLOWLY THAT OTHER PEOPLE COULD HAVE NOTICED. OR THE OPPOSITE, BEING SO FIGETY OR RESTLESS THAT YOU HAVE BEEN MOVING AROUND A LOT MORE THAN USUAL: 0
6. FEELING BAD ABOUT YOURSELF - OR THAT YOU ARE A FAILURE OR HAVE LET YOURSELF OR YOUR FAMILY DOWN: 3
SUM OF ALL RESPONSES TO PHQ QUESTIONS 1-9: 15
SUM OF ALL RESPONSES TO PHQ QUESTIONS 1-9: 15
2. FEELING DOWN, DEPRESSED OR HOPELESS: 1
1. LITTLE INTEREST OR PLEASURE IN DOING THINGS: 1
SUM OF ALL RESPONSES TO PHQ QUESTIONS 1-9: 15
3. TROUBLE FALLING OR STAYING ASLEEP: 3
4. FEELING TIRED OR HAVING LITTLE ENERGY: 3

## 2023-08-09 ASSESSMENT — ANXIETY QUESTIONNAIRES
6. BECOMING EASILY ANNOYED OR IRRITABLE: 3
4. TROUBLE RELAXING: 3
2. NOT BEING ABLE TO STOP OR CONTROL WORRYING: 1
GAD7 TOTAL SCORE: 13
3. WORRYING TOO MUCH ABOUT DIFFERENT THINGS: 2
7. FEELING AFRAID AS IF SOMETHING AWFUL MIGHT HAPPEN: 2
5. BEING SO RESTLESS THAT IT IS HARD TO SIT STILL: 0
1. FEELING NERVOUS, ANXIOUS, OR ON EDGE: 2
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
## 2023-10-09 ENCOUNTER — TELEMEDICINE (OUTPATIENT)
Dept: BEHAVIORAL/MENTAL HEALTH CLINIC | Age: 68
End: 2023-10-09
Payer: MEDICARE

## 2023-10-09 DIAGNOSIS — F33.1 MDD (MAJOR DEPRESSIVE DISORDER), RECURRENT EPISODE, MODERATE (HCC): Primary | ICD-10-CM

## 2023-10-09 DIAGNOSIS — F51.04 PSYCHOPHYSIOLOGICAL INSOMNIA: ICD-10-CM

## 2023-10-09 DIAGNOSIS — F41.1 GENERALIZED ANXIETY DISORDER: ICD-10-CM

## 2023-10-09 PROCEDURE — 3017F COLORECTAL CA SCREEN DOC REV: CPT | Performed by: NURSE PRACTITIONER

## 2023-10-09 PROCEDURE — 1123F ACP DISCUSS/DSCN MKR DOCD: CPT | Performed by: NURSE PRACTITIONER

## 2023-10-09 PROCEDURE — G8427 DOCREV CUR MEDS BY ELIG CLIN: HCPCS | Performed by: NURSE PRACTITIONER

## 2023-10-09 PROCEDURE — 1090F PRES/ABSN URINE INCON ASSESS: CPT | Performed by: NURSE PRACTITIONER

## 2023-10-09 PROCEDURE — G8399 PT W/DXA RESULTS DOCUMENT: HCPCS | Performed by: NURSE PRACTITIONER

## 2023-10-09 PROCEDURE — 99215 OFFICE O/P EST HI 40 MIN: CPT | Performed by: NURSE PRACTITIONER

## 2023-10-09 RX ORDER — LORAZEPAM 0.5 MG/1
0.75 TABLET ORAL NIGHTLY PRN
Qty: 30 TABLET | Refills: 2 | Status: SHIPPED | OUTPATIENT
Start: 2023-10-09 | End: 2023-11-08

## 2023-10-11 ENCOUNTER — OFFICE VISIT (OUTPATIENT)
Dept: BEHAVIORAL/MENTAL HEALTH CLINIC | Facility: CLINIC | Age: 68
End: 2023-10-11
Payer: MEDICARE

## 2023-10-11 DIAGNOSIS — F33.1 MDD (MAJOR DEPRESSIVE DISORDER), RECURRENT EPISODE, MODERATE (HCC): Primary | ICD-10-CM

## 2023-10-11 PROCEDURE — 1036F TOBACCO NON-USER: CPT | Performed by: COUNSELOR

## 2023-10-11 PROCEDURE — 90834 PSYTX W PT 45 MINUTES: CPT | Performed by: COUNSELOR

## 2023-10-11 PROCEDURE — 1123F ACP DISCUSS/DSCN MKR DOCD: CPT | Performed by: COUNSELOR

## 2023-10-11 ASSESSMENT — ANXIETY QUESTIONNAIRES
2. NOT BEING ABLE TO STOP OR CONTROL WORRYING: 1
5. BEING SO RESTLESS THAT IT IS HARD TO SIT STILL: 1
GAD7 TOTAL SCORE: 16
IF YOU CHECKED OFF ANY PROBLEMS ON THIS QUESTIONNAIRE, HOW DIFFICULT HAVE THESE PROBLEMS MADE IT FOR YOU TO DO YOUR WORK, TAKE CARE OF THINGS AT HOME, OR GET ALONG WITH OTHER PEOPLE: VERY DIFFICULT
4. TROUBLE RELAXING: 3
7. FEELING AFRAID AS IF SOMETHING AWFUL MIGHT HAPPEN: 3
3. WORRYING TOO MUCH ABOUT DIFFERENT THINGS: 2
6. BECOMING EASILY ANNOYED OR IRRITABLE: 3
1. FEELING NERVOUS, ANXIOUS, OR ON EDGE: 3

## 2023-10-11 ASSESSMENT — PATIENT HEALTH QUESTIONNAIRE - PHQ9
SUM OF ALL RESPONSES TO PHQ QUESTIONS 1-9: 17
SUM OF ALL RESPONSES TO PHQ9 QUESTIONS 1 & 2: 4
SUM OF ALL RESPONSES TO PHQ QUESTIONS 1-9: 18
10. IF YOU CHECKED OFF ANY PROBLEMS, HOW DIFFICULT HAVE THESE PROBLEMS MADE IT FOR YOU TO DO YOUR WORK, TAKE CARE OF THINGS AT HOME, OR GET ALONG WITH OTHER PEOPLE: 2
2. FEELING DOWN, DEPRESSED OR HOPELESS: 2
5. POOR APPETITE OR OVEREATING: 3
4. FEELING TIRED OR HAVING LITTLE ENERGY: 3
7. TROUBLE CONCENTRATING ON THINGS, SUCH AS READING THE NEWSPAPER OR WATCHING TELEVISION: 1
6. FEELING BAD ABOUT YOURSELF - OR THAT YOU ARE A FAILURE OR HAVE LET YOURSELF OR YOUR FAMILY DOWN: 3
3. TROUBLE FALLING OR STAYING ASLEEP: 3
9. THOUGHTS THAT YOU WOULD BE BETTER OFF DEAD, OR OF HURTING YOURSELF: 1
SUM OF ALL RESPONSES TO PHQ QUESTIONS 1-9: 18
8. MOVING OR SPEAKING SO SLOWLY THAT OTHER PEOPLE COULD HAVE NOTICED. OR THE OPPOSITE, BEING SO FIGETY OR RESTLESS THAT YOU HAVE BEEN MOVING AROUND A LOT MORE THAN USUAL: 0
1. LITTLE INTEREST OR PLEASURE IN DOING THINGS: 2
SUM OF ALL RESPONSES TO PHQ QUESTIONS 1-9: 18

## 2023-10-11 ASSESSMENT — COLUMBIA-SUICIDE SEVERITY RATING SCALE - C-SSRS
1. WITHIN THE PAST MONTH, HAVE YOU WISHED YOU WERE DEAD OR WISHED YOU COULD GO TO SLEEP AND NOT WAKE UP?: NO
2. HAVE YOU ACTUALLY HAD ANY THOUGHTS OF KILLING YOURSELF?: NO
6. HAVE YOU EVER DONE ANYTHING, STARTED TO DO ANYTHING, OR PREPARED TO DO ANYTHING TO END YOUR LIFE?: NO

## 2023-10-11 NOTE — PROGRESS NOTES
[facility] Internal Medicine  [address]  [place], [address] [place]    INDIVIDUAL THERAPY NOTE    NAME: Larissa Toledo    DATE: 10/11/2023    TYPE OF SERVICE: Individual Therapy    LOCATION OF SERVICE: In Office    TOTAL TIME: 45 minutes    DIAGNOSIS:    1. MDD (major depressive disorder), recurrent episode, moderate (HCC)      MENTAL STATUS EXAM:  Pt appears well nourished,  Pt was appropriately dressed and groomed. Attention span was age appropriate. Insight and judgment were age appropriate. Speech pattern was even. No bizarre or psychotic behaviors were observed. Motor coordination was good. Pt.s eye content was good and manner of relating appeared developmentally within normal range. MOOD AND AFFECT: Anxious with congruent affect    THOUGHT PROCESS: Goal-directed and logical    PLAN: CBT may be used to address goals. Pt is progressing on goals. Pt will decrease her symptoms of depression and anxiety by recognizing cognitive distortions and positively reframing them as evidenced by self-report and lower PHQ and JASON scores    SUMMARY OF SERVICE: Patient returns for follow-up individual therapy session with provider. Pt hs spent time on vacation in [place] with her  and in [place] with her grandchildren. Pt enjoys these vacations because it helps relieve stress. Provider administered PHQ-9 and JASON-7 assessments to pt. Pt's PHQ-9 and JASON-7 scores are 18 and 16 respectively indicating moderately severe depression and severe anxiety. Provider assisted pt with processing sources/triggers for current symptoms. Pt continues to volunteer but recognizes she often over commits to things. Pt is also continuing to manage her mother's care in [place]. Pt is worried about one of her nephews who is preparing for a major surgery and does not have a relationship with his siblings.  Pt is questioning if she should volunteer as an emergency contact should something happen during his No

## 2023-10-30 ENCOUNTER — OFFICE VISIT (OUTPATIENT)
Dept: BEHAVIORAL/MENTAL HEALTH CLINIC | Facility: CLINIC | Age: 68
End: 2023-10-30
Payer: MEDICARE

## 2023-10-30 DIAGNOSIS — F33.1 MDD (MAJOR DEPRESSIVE DISORDER), RECURRENT EPISODE, MODERATE (HCC): Primary | ICD-10-CM

## 2023-10-30 PROCEDURE — 1036F TOBACCO NON-USER: CPT | Performed by: COUNSELOR

## 2023-10-30 PROCEDURE — 90837 PSYTX W PT 60 MINUTES: CPT | Performed by: COUNSELOR

## 2023-10-30 PROCEDURE — 1123F ACP DISCUSS/DSCN MKR DOCD: CPT | Performed by: COUNSELOR

## 2023-10-30 ASSESSMENT — ANXIETY QUESTIONNAIRES
1. FEELING NERVOUS, ANXIOUS, OR ON EDGE: 3
5. BEING SO RESTLESS THAT IT IS HARD TO SIT STILL: 1
3. WORRYING TOO MUCH ABOUT DIFFERENT THINGS: 2
7. FEELING AFRAID AS IF SOMETHING AWFUL MIGHT HAPPEN: 3
IF YOU CHECKED OFF ANY PROBLEMS ON THIS QUESTIONNAIRE, HOW DIFFICULT HAVE THESE PROBLEMS MADE IT FOR YOU TO DO YOUR WORK, TAKE CARE OF THINGS AT HOME, OR GET ALONG WITH OTHER PEOPLE: VERY DIFFICULT
GAD7 TOTAL SCORE: 17
4. TROUBLE RELAXING: 3
2. NOT BEING ABLE TO STOP OR CONTROL WORRYING: 2
6. BECOMING EASILY ANNOYED OR IRRITABLE: 3

## 2023-10-30 ASSESSMENT — PATIENT HEALTH QUESTIONNAIRE - PHQ9
SUM OF ALL RESPONSES TO PHQ9 QUESTIONS 1 & 2: 5
2. FEELING DOWN, DEPRESSED OR HOPELESS: 3
SUM OF ALL RESPONSES TO PHQ QUESTIONS 1-9: 17
1. LITTLE INTEREST OR PLEASURE IN DOING THINGS: 2
4. FEELING TIRED OR HAVING LITTLE ENERGY: 2
9. THOUGHTS THAT YOU WOULD BE BETTER OFF DEAD, OR OF HURTING YOURSELF: 1
3. TROUBLE FALLING OR STAYING ASLEEP: 3
5. POOR APPETITE OR OVEREATING: 2
SUM OF ALL RESPONSES TO PHQ QUESTIONS 1-9: 17
10. IF YOU CHECKED OFF ANY PROBLEMS, HOW DIFFICULT HAVE THESE PROBLEMS MADE IT FOR YOU TO DO YOUR WORK, TAKE CARE OF THINGS AT HOME, OR GET ALONG WITH OTHER PEOPLE: 2
SUM OF ALL RESPONSES TO PHQ QUESTIONS 1-9: 17
7. TROUBLE CONCENTRATING ON THINGS, SUCH AS READING THE NEWSPAPER OR WATCHING TELEVISION: 1
6. FEELING BAD ABOUT YOURSELF - OR THAT YOU ARE A FAILURE OR HAVE LET YOURSELF OR YOUR FAMILY DOWN: 3
SUM OF ALL RESPONSES TO PHQ QUESTIONS 1-9: 16
8. MOVING OR SPEAKING SO SLOWLY THAT OTHER PEOPLE COULD HAVE NOTICED. OR THE OPPOSITE, BEING SO FIGETY OR RESTLESS THAT YOU HAVE BEEN MOVING AROUND A LOT MORE THAN USUAL: 0

## 2023-10-30 ASSESSMENT — COLUMBIA-SUICIDE SEVERITY RATING SCALE - C-SSRS
6. HAVE YOU EVER DONE ANYTHING, STARTED TO DO ANYTHING, OR PREPARED TO DO ANYTHING TO END YOUR LIFE?: NO
7. DID THIS OCCUR IN THE LAST THREE MONTHS: NO
BASED ON RESPONSES TO C-SSRS QS 1-6, WHAT IS THE PATIENT'S OVERALL RISK RATING FOR SUICIDE: LOW RISK
5. HAVE YOU STARTED TO WORK OUT OR WORKED OUT THE DETAILS OF HOW TO KILL YOURSELF? DO YOU INTEND TO CARRY OUT THIS PLAN?: NO
1. WITHIN THE PAST MONTH, HAVE YOU WISHED YOU WERE DEAD OR WISHED YOU COULD GO TO SLEEP AND NOT WAKE UP?: YES
2. HAVE YOU ACTUALLY HAD ANY THOUGHTS OF KILLING YOURSELF?: NO
3. HAVE YOU BEEN THINKING ABOUT HOW YOU MIGHT KILL YOURSELF?: NO
4. HAVE YOU HAD THESE THOUGHTS AND HAD SOME INTENTION OF ACTING ON THEM?: NO

## 2023-12-04 ENCOUNTER — OFFICE VISIT (OUTPATIENT)
Dept: BEHAVIORAL/MENTAL HEALTH CLINIC | Facility: CLINIC | Age: 68
End: 2023-12-04
Payer: MEDICARE

## 2023-12-04 DIAGNOSIS — F33.1 MDD (MAJOR DEPRESSIVE DISORDER), RECURRENT EPISODE, MODERATE (HCC): Primary | ICD-10-CM

## 2023-12-04 PROCEDURE — 1036F TOBACCO NON-USER: CPT | Performed by: COUNSELOR

## 2023-12-04 PROCEDURE — 90837 PSYTX W PT 60 MINUTES: CPT | Performed by: COUNSELOR

## 2023-12-04 PROCEDURE — 1123F ACP DISCUSS/DSCN MKR DOCD: CPT | Performed by: COUNSELOR

## 2023-12-04 ASSESSMENT — PATIENT HEALTH QUESTIONNAIRE - PHQ9
SUM OF ALL RESPONSES TO PHQ QUESTIONS 1-9: 18
8. MOVING OR SPEAKING SO SLOWLY THAT OTHER PEOPLE COULD HAVE NOTICED. OR THE OPPOSITE, BEING SO FIGETY OR RESTLESS THAT YOU HAVE BEEN MOVING AROUND A LOT MORE THAN USUAL: 0
9. THOUGHTS THAT YOU WOULD BE BETTER OFF DEAD, OR OF HURTING YOURSELF: 0
SUM OF ALL RESPONSES TO PHQ QUESTIONS 1-9: 18
SUM OF ALL RESPONSES TO PHQ9 QUESTIONS 1 & 2: 5
10. IF YOU CHECKED OFF ANY PROBLEMS, HOW DIFFICULT HAVE THESE PROBLEMS MADE IT FOR YOU TO DO YOUR WORK, TAKE CARE OF THINGS AT HOME, OR GET ALONG WITH OTHER PEOPLE: 2
4. FEELING TIRED OR HAVING LITTLE ENERGY: 3
SUM OF ALL RESPONSES TO PHQ QUESTIONS 1-9: 18
1. LITTLE INTEREST OR PLEASURE IN DOING THINGS: 2
6. FEELING BAD ABOUT YOURSELF - OR THAT YOU ARE A FAILURE OR HAVE LET YOURSELF OR YOUR FAMILY DOWN: 2
2. FEELING DOWN, DEPRESSED OR HOPELESS: 3
3. TROUBLE FALLING OR STAYING ASLEEP: 3
5. POOR APPETITE OR OVEREATING: 3
SUM OF ALL RESPONSES TO PHQ QUESTIONS 1-9: 18
7. TROUBLE CONCENTRATING ON THINGS, SUCH AS READING THE NEWSPAPER OR WATCHING TELEVISION: 2

## 2023-12-04 ASSESSMENT — ANXIETY QUESTIONNAIRES
6. BECOMING EASILY ANNOYED OR IRRITABLE: 3
3. WORRYING TOO MUCH ABOUT DIFFERENT THINGS: 1
5. BEING SO RESTLESS THAT IT IS HARD TO SIT STILL: 2
7. FEELING AFRAID AS IF SOMETHING AWFUL MIGHT HAPPEN: 3
4. TROUBLE RELAXING: 3
2. NOT BEING ABLE TO STOP OR CONTROL WORRYING: 1
IF YOU CHECKED OFF ANY PROBLEMS ON THIS QUESTIONNAIRE, HOW DIFFICULT HAVE THESE PROBLEMS MADE IT FOR YOU TO DO YOUR WORK, TAKE CARE OF THINGS AT HOME, OR GET ALONG WITH OTHER PEOPLE: VERY DIFFICULT
GAD7 TOTAL SCORE: 16
1. FEELING NERVOUS, ANXIOUS, OR ON EDGE: 3

## 2023-12-04 ASSESSMENT — COLUMBIA-SUICIDE SEVERITY RATING SCALE - C-SSRS
3. HAVE YOU BEEN THINKING ABOUT HOW YOU MIGHT KILL YOURSELF?: NO
4. HAVE YOU HAD THESE THOUGHTS AND HAD SOME INTENTION OF ACTING ON THEM?: NO
7. DID THIS OCCUR IN THE LAST THREE MONTHS: NO
5. HAVE YOU STARTED TO WORK OUT OR WORKED OUT THE DETAILS OF HOW TO KILL YOURSELF? DO YOU INTEND TO CARRY OUT THIS PLAN?: NO
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
## 2024-01-08 ENCOUNTER — OFFICE VISIT (OUTPATIENT)
Dept: BEHAVIORAL/MENTAL HEALTH CLINIC | Age: 69
End: 2024-01-08
Payer: MEDICARE

## 2024-01-08 VITALS
HEART RATE: 61 BPM | OXYGEN SATURATION: 98 % | WEIGHT: 139 LBS | BODY MASS INDEX: 24.62 KG/M2 | SYSTOLIC BLOOD PRESSURE: 108 MMHG | DIASTOLIC BLOOD PRESSURE: 68 MMHG | TEMPERATURE: 97.7 F

## 2024-01-08 DIAGNOSIS — F33.1 MDD (MAJOR DEPRESSIVE DISORDER), RECURRENT EPISODE, MODERATE (HCC): ICD-10-CM

## 2024-01-08 DIAGNOSIS — F41.1 GENERALIZED ANXIETY DISORDER: Primary | ICD-10-CM

## 2024-01-08 DIAGNOSIS — F51.04 PSYCHOPHYSIOLOGICAL INSOMNIA: ICD-10-CM

## 2024-01-08 PROCEDURE — 1090F PRES/ABSN URINE INCON ASSESS: CPT | Performed by: NURSE PRACTITIONER

## 2024-01-08 PROCEDURE — 3017F COLORECTAL CA SCREEN DOC REV: CPT | Performed by: NURSE PRACTITIONER

## 2024-01-08 PROCEDURE — 1036F TOBACCO NON-USER: CPT | Performed by: NURSE PRACTITIONER

## 2024-01-08 PROCEDURE — G8427 DOCREV CUR MEDS BY ELIG CLIN: HCPCS | Performed by: NURSE PRACTITIONER

## 2024-01-08 PROCEDURE — 1123F ACP DISCUSS/DSCN MKR DOCD: CPT | Performed by: NURSE PRACTITIONER

## 2024-01-08 PROCEDURE — G8420 CALC BMI NORM PARAMETERS: HCPCS | Performed by: NURSE PRACTITIONER

## 2024-01-08 PROCEDURE — G8399 PT W/DXA RESULTS DOCUMENT: HCPCS | Performed by: NURSE PRACTITIONER

## 2024-01-08 PROCEDURE — G8484 FLU IMMUNIZE NO ADMIN: HCPCS | Performed by: NURSE PRACTITIONER

## 2024-01-08 PROCEDURE — 99215 OFFICE O/P EST HI 40 MIN: CPT | Performed by: NURSE PRACTITIONER

## 2024-01-08 RX ORDER — LORAZEPAM 0.5 MG/1
0.25 TABLET ORAL NIGHTLY PRN
Qty: 30 TABLET | Refills: 2 | Status: SHIPPED | OUTPATIENT
Start: 2024-01-08 | End: 2024-02-07

## 2024-01-08 ASSESSMENT — PATIENT HEALTH QUESTIONNAIRE - PHQ9
6. FEELING BAD ABOUT YOURSELF - OR THAT YOU ARE A FAILURE OR HAVE LET YOURSELF OR YOUR FAMILY DOWN: 1
SUM OF ALL RESPONSES TO PHQ QUESTIONS 1-9: 16
1. LITTLE INTEREST OR PLEASURE IN DOING THINGS: 3
9. THOUGHTS THAT YOU WOULD BE BETTER OFF DEAD, OR OF HURTING YOURSELF: 0
10. IF YOU CHECKED OFF ANY PROBLEMS, HOW DIFFICULT HAVE THESE PROBLEMS MADE IT FOR YOU TO DO YOUR WORK, TAKE CARE OF THINGS AT HOME, OR GET ALONG WITH OTHER PEOPLE: 2
SUM OF ALL RESPONSES TO PHQ9 QUESTIONS 1 & 2: 5
4. FEELING TIRED OR HAVING LITTLE ENERGY: 3
SUM OF ALL RESPONSES TO PHQ QUESTIONS 1-9: 16
2. FEELING DOWN, DEPRESSED OR HOPELESS: 2
SUM OF ALL RESPONSES TO PHQ QUESTIONS 1-9: 16
3. TROUBLE FALLING OR STAYING ASLEEP: 3
5. POOR APPETITE OR OVEREATING: 2
SUM OF ALL RESPONSES TO PHQ QUESTIONS 1-9: 16
7. TROUBLE CONCENTRATING ON THINGS, SUCH AS READING THE NEWSPAPER OR WATCHING TELEVISION: 2
8. MOVING OR SPEAKING SO SLOWLY THAT OTHER PEOPLE COULD HAVE NOTICED. OR THE OPPOSITE, BEING SO FIGETY OR RESTLESS THAT YOU HAVE BEEN MOVING AROUND A LOT MORE THAN USUAL: 0
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
## 2024-01-11 ENCOUNTER — OFFICE VISIT (OUTPATIENT)
Dept: BEHAVIORAL/MENTAL HEALTH CLINIC | Facility: CLINIC | Age: 69
End: 2024-01-11
Payer: MEDICARE

## 2024-01-11 DIAGNOSIS — F41.1 GENERALIZED ANXIETY DISORDER: Primary | ICD-10-CM

## 2024-01-11 PROCEDURE — 90837 PSYTX W PT 60 MINUTES: CPT | Performed by: COUNSELOR

## 2024-01-11 PROCEDURE — 1123F ACP DISCUSS/DSCN MKR DOCD: CPT | Performed by: COUNSELOR

## 2024-01-11 NOTE — PROGRESS NOTES
[facility] Internal [facility]  [address]  [place] [address]    INDIVIDUAL THERAPY NOTE    NAME: Soledad Goldberg    DATE: 1/11/2024    TYPE OF SERVICE: Individual Therapy    LOCATION OF SERVICE: In Office    TOTAL TIME: 60 minutes    DIAGNOSIS:    1. Generalized anxiety disorder      MENTAL STATUS EXAM:  Pt appears well nourished,  Pt was appropriately dressed and groomed.  Attention span was age appropriate. Insight and judgment were age appropriate.  Speech pattern was even.  No bizarre or psychotic behaviors were observed. Motor coordination was good. Pt.s eye content was good and manner of relating appeared developmentally within normal range.    MOOD AND AFFECT: Euthymic with congruent affect    THOUGHT PROCESS: Goal-directed and logical    PLAN: CBT may be used to address goals.    Pt is progressing on goals.    Pt will decrease her symptoms of depression and anxiety by recognizing cognitive distortions and positively reframing them as evidenced by self-report and lower PHQ and JASON scores.      SUMMARY OF SERVICE: Patient returns for follow-up individual therapy session with provider.  Patient reported that her Christmas holiday was \"okay.\"  Patient recently saw Angi Taylor for medication management.  Patient is cutting back on her use of lorazepam.  Patient reported that she cannot tell any difference since she has titrated.  Patient has been using Unisom to help with sleep.  Patient reported going to bed and waking up in the morning with racing thoughts and excessive worrying.  Patient shared details about family history and her upbringing.  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 Supportive therapy provided for identified psychosocial stressors to improve the pt's self-esteem, psychological functioning and adaptive skills. Patient discussed certain situational and personal

## 2024-02-26 ENCOUNTER — OFFICE VISIT (OUTPATIENT)
Dept: BEHAVIORAL/MENTAL HEALTH CLINIC | Facility: CLINIC | Age: 69
End: 2024-02-26
Payer: MEDICARE

## 2024-02-26 DIAGNOSIS — F33.1 MDD (MAJOR DEPRESSIVE DISORDER), RECURRENT EPISODE, MODERATE (HCC): Primary | ICD-10-CM

## 2024-02-26 DIAGNOSIS — F41.1 GENERALIZED ANXIETY DISORDER: ICD-10-CM

## 2024-02-26 PROCEDURE — 90837 PSYTX W PT 60 MINUTES: CPT | Performed by: COUNSELOR

## 2024-02-26 PROCEDURE — 1123F ACP DISCUSS/DSCN MKR DOCD: CPT | Performed by: COUNSELOR

## 2024-02-26 NOTE — PROGRESS NOTES
[facility] Internal Medicine  [address]  [place] [address]    INDIVIDUAL THERAPY NOTE    NAME: Soledad Goldberg    DATE: 2/26/2024    TYPE OF SERVICE: Individual Therapy    LOCATION OF SERVICE: In Office    TOTAL TIME: 60 minutes    DIAGNOSIS:    1. MDD (major depressive disorder), recurrent episode, moderate (HCC)    2. Generalized anxiety disorder      MENTAL STATUS EXAM:  Pt appears well nourished,  Pt was appropriately dressed and groomed.  Attention span was age appropriate. Insight and judgment were age appropriate.  Speech pattern was even.  No bizarre or psychotic behaviors were observed. Motor coordination was good. Pt.s eye content was good and manner of relating appeared developmentally within normal range.    MOOD AND AFFECT: Anxious with congruent affect    THOUGHT PROCESS: Goal-directed and logical    PLAN: CBT may be used to address goals.    Pt is progressing on goals.    Pt will decrease her symptoms of depression and anxiety by recognizing cognitive distortions and positively reframing them as evidenced by self-report and lower PHQ and JASON scores.      SUMMARY OF SERVICE: Patient returns for follow-up individual therapy session with provider. Provider assisted pt with processing sources/triggers for current symptoms. Pt denied SI/HI/AVH.    Supportive therapy provided for identified psychosocial stressors to improve the pt's self-esteem, psychological functioning and adaptive skills. Patient discussed certain situational and personal stressors ongoing in her life at this time. Patient allowed to vent about the negative impact of stressors.      FOLLOW UP: Return in about 3 weeks (around 3/18/2024).    NATALIE García on 2/26/2024 at 4:42 PM    An electronic signature was used to authenticate this note.

## 2024-03-12 NOTE — PROGRESS NOTES
[facility] Internal Medicine  [address] Nelly Lozada, [address] [facility]    INDIVIDUAL THERAPY NOTE    NAME: Columba Kapoor    DATE: 10/30/2023    TYPE OF SERVICE: Individual Therapy    LOCATION OF SERVICE: In Office    TOTAL TIME: 60 minutes    DIAGNOSIS:    1. MDD (major depressive disorder), recurrent episode, moderate (HCC)        MENTAL STATUS EXAM:  Pt appears well nourished,  Pt was appropriately dressed and groomed. Attention span was age appropriate. Insight and judgment were age appropriate. Speech pattern was even. No bizarre or psychotic behaviors were observed. Motor coordination was good. Pt.s eye content was good and manner of relating appeared developmentally within normal range. MOOD AND AFFECT: Depressed/anxious with congruent affect    THOUGHT PROCESS: Goal-directed and logical    PLAN: CBT may be used to address goals. Pt is progressing on goals. Pt will decrease her symptoms of depression and anxiety by recognizing cognitive distortions and positively reframing them as evidenced by self-report and lower PHQ and JASON scores. SUMMARY OF SERVICE: Patient returns for follow-up individual therapy session with provider. Provider administered PHQ-9 and JASON-7 assessments to pt. Pt's PHQ-9 and JASON-7 scores are 17 and 17 respectively indicating moderately severe depression and severe anxiety. Provider assisted pt with processing sources/triggers for current symptoms. Pt reported her nephew's surgery went well. Pt also reported her mother has improved some after having a bad UTI that was causing some cognitive impairment. Pt was relieved to find out her mother did not have dementia. Pt's  recently experienced a medical emergency and was diagnosed with TGA (Transient Global Amnesia). Pt expressed worry and concern related to her 's health during the episode. While the recovery process is good, pt's stepchildren are concerned about their father not doing too much.  Pt You can access the FollowMyHealth Patient Portal offered by [facility] by registering at the following website: [email or web address]. By joining [employer]’s FollowMyHealth portal, you will also be able to view your health information using other applications (apps) compatible with our system.

## 2024-03-25 ENCOUNTER — TRANSCRIBE ORDERS (OUTPATIENT)
Dept: SCHEDULING | Age: 69
End: 2024-03-25

## 2024-03-25 DIAGNOSIS — Z12.31 SCREENING MAMMOGRAM FOR HIGH-RISK PATIENT: Primary | ICD-10-CM

## 2024-04-30 NOTE — PROGRESS NOTES
2024    Soledad PALENCIA Yusuf  1955      PCP: None, None (Inactive)  [employer] retired. Lives near [place].  Patient does not see them for regular preventative visits.      HPI: 68 y.o. year old  Here for annual gyn wellness exam.   No gyn concerns except has to go for additional imaging after her recent mammogram.  S/p hysterectomy- JESSIE  for benign indications. S/p BSO at a later date, also benign indications.   Had sling early .   ?cervix present- no    Patient's last menstrual period was 1999.    Social History     Socioeconomic History    Marital status:      Spouse name: None    Number of children: None    Years of education: None    Highest education level: None   Tobacco Use    Smoking status: Never    Smokeless tobacco: Never   Vaping Use    Vaping Use: Never used   Substance and Sexual Activity    Alcohol use: Yes     Alcohol/week: 1.0 standard drink of alcohol     Types: 1 Standard drinks or equivalent per week    Drug use: No    Sexual activity: Yes     Partners: Male     Birth control/protection: Surgical     Comment: hysterectomy     Social Determinants of Health     Financial Resource Strain: Low Risk  (2024)    Overall Financial Resource Strain (CARDIA)     Difficulty of Paying Living Expenses: Not hard at all   Food Insecurity: No Food Insecurity (2024)    Hunger Vital Sign     Worried About Running Out of Food in the Last Year: Never true     Ran Out of Food in the Last Year: Never true   Transportation Needs: Unknown (2024)    PRAPARE - Transportation     Lack of Transportation (Non-Medical): No   Housing Stability: Unknown (2024)    Housing Stability Vital Sign     Unstable Housing in the Last Year: No     Last pap - no hx abnl paps. No new sex partners.   Lipids- will defer to new PCP  Colonoscopy- . Repeat at 10yrs.   DEXA- normal   Mammogram-  . BD2. Needs additional imaging on L. Scheduled for 5-30    Allergies, medications, past

## 2024-05-01 ENCOUNTER — OFFICE VISIT (OUTPATIENT)
Dept: OBGYN CLINIC | Age: 69
End: 2024-05-01
Payer: MEDICARE

## 2024-05-01 VITALS
SYSTOLIC BLOOD PRESSURE: 120 MMHG | WEIGHT: 137 LBS | DIASTOLIC BLOOD PRESSURE: 70 MMHG | BODY MASS INDEX: 24.27 KG/M2 | HEIGHT: 63 IN

## 2024-05-01 DIAGNOSIS — Z12.31 ENCOUNTER FOR SCREENING MAMMOGRAM FOR MALIGNANT NEOPLASM OF BREAST: ICD-10-CM

## 2024-05-01 DIAGNOSIS — Z01.419 WELL WOMAN EXAM: Primary | ICD-10-CM

## 2024-05-01 DIAGNOSIS — Z76.89 ENCOUNTER TO ESTABLISH CARE: ICD-10-CM

## 2024-05-01 DIAGNOSIS — E03.8 HYPOTHYROIDISM DUE TO HASHIMOTO'S THYROIDITIS: ICD-10-CM

## 2024-05-01 DIAGNOSIS — Z12.11 SCREEN FOR COLON CANCER: ICD-10-CM

## 2024-05-01 DIAGNOSIS — E06.3 HYPOTHYROIDISM DUE TO HASHIMOTO'S THYROIDITIS: ICD-10-CM

## 2024-05-01 PROCEDURE — G0101 CA SCREEN;PELVIC/BREAST EXAM: HCPCS | Performed by: OBSTETRICS & GYNECOLOGY

## 2024-05-01 SDOH — ECONOMIC STABILITY: FOOD INSECURITY: WITHIN THE PAST 12 MONTHS, YOU WORRIED THAT YOUR FOOD WOULD RUN OUT BEFORE YOU GOT MONEY TO BUY MORE.: NEVER TRUE

## 2024-05-01 SDOH — ECONOMIC STABILITY: FOOD INSECURITY: WITHIN THE PAST 12 MONTHS, THE FOOD YOU BOUGHT JUST DIDN'T LAST AND YOU DIDN'T HAVE MONEY TO GET MORE.: NEVER TRUE

## 2024-05-01 SDOH — ECONOMIC STABILITY: INCOME INSECURITY: HOW HARD IS IT FOR YOU TO PAY FOR THE VERY BASICS LIKE FOOD, HOUSING, MEDICAL CARE, AND HEATING?: NOT HARD AT ALL

## 2024-05-01 SDOH — ECONOMIC STABILITY: HOUSING INSECURITY
IN THE LAST 12 MONTHS, WAS THERE A TIME WHEN YOU DID NOT HAVE A STEADY PLACE TO SLEEP OR SLEPT IN A SHELTER (INCLUDING NOW)?: NO

## 2024-06-12 ENCOUNTER — HOSPITAL ENCOUNTER (OUTPATIENT)
Dept: MAMMOGRAPHY | Age: 69
Discharge: HOME | End: 2024-06-15
Attending: OBSTETRICS & GYNECOLOGY
Payer: MEDICARE

## 2024-06-12 DIAGNOSIS — R92.8 ABNORMAL SCREENING MAMMOGRAM: ICD-10-CM

## 2024-06-12 PROCEDURE — 76642 ULTRASOUND BREAST LIMITED: CPT

## 2024-06-12 PROCEDURE — G0279 TOMOSYNTHESIS, MAMMO: HCPCS

## 2024-06-26 ENCOUNTER — HOSPITAL ENCOUNTER (OUTPATIENT)
Dept: MAMMOGRAPHY | Age: 69
Discharge: HOME | End: 2024-06-29
Payer: MEDICARE

## 2024-06-26 DIAGNOSIS — R92.8 ABNORMAL ULTRASOUND OF BREAST: ICD-10-CM

## 2024-06-26 PROCEDURE — 2500000003 HC RX 250 WO HCPCS: Performed by: OBSTETRICS & GYNECOLOGY

## 2024-06-26 PROCEDURE — 19083 BX BREAST 1ST LESION US IMAG: CPT

## 2024-06-26 PROCEDURE — 77065 DX MAMMO INCL CAD UNI: CPT

## 2024-06-26 PROCEDURE — 88305 TISSUE EXAM BY PATHOLOGIST: CPT

## 2024-06-26 RX ORDER — LIDOCAINE HYDROCHLORIDE 10 MG/ML
10 INJECTION, SOLUTION INFILTRATION; PERINEURAL ONCE
Status: COMPLETED | OUTPATIENT
Start: 2024-06-26 | End: 2024-06-26

## 2024-06-26 RX ADMIN — LIDOCAINE HYDROCHLORIDE 8 ML: 10 INJECTION, SOLUTION INFILTRATION; PERINEURAL at 09:41

## 2024-06-26 ASSESSMENT — PAIN SCALES - GENERAL: PAINLEVEL_OUTOF10: 2

## 2024-06-28 ENCOUNTER — TELEPHONE (OUTPATIENT)
Dept: MAMMOGRAPHY | Age: 69
End: 2024-06-28

## 2024-06-28 NOTE — TELEPHONE ENCOUNTER
Patient presented to the breast cancer for biopsy results. Biopsy results came back as Right Breast Papilloma. The radiologist is recommending a surgical consultation. Patient is scheduled with Dr. Barraza on 07/15.

## 2024-07-09 PROBLEM — D24.1 INTRADUCTAL PAPILLOMA OF BREAST, RIGHT: Status: ACTIVE | Noted: 2024-07-09

## 2024-07-09 NOTE — PROGRESS NOTES
management  service on this patient.      I have independently seen the patient.   I have independently obtained the above history from the patient/family.    I have independently examined the patient with above findings.  I have independently reviewed data/labs for this patient and developed the above plan of care (MDM).      Signed: BRIANNA TUCKER MD  7/9/2024    11:03 AM

## 2024-07-10 ENCOUNTER — OFFICE VISIT (OUTPATIENT)
Dept: SURGERY | Age: 69
End: 2024-07-10
Payer: MEDICARE

## 2024-07-10 ENCOUNTER — PREP FOR PROCEDURE (OUTPATIENT)
Dept: SURGERY | Age: 69
End: 2024-07-10

## 2024-07-10 VITALS
SYSTOLIC BLOOD PRESSURE: 148 MMHG | WEIGHT: 148 LBS | BODY MASS INDEX: 26.22 KG/M2 | HEART RATE: 60 BPM | DIASTOLIC BLOOD PRESSURE: 73 MMHG

## 2024-07-10 DIAGNOSIS — D24.1 INTRADUCTAL PAPILLOMA OF BREAST, RIGHT: Primary | ICD-10-CM

## 2024-07-10 DIAGNOSIS — D24.1 BENIGN NEOPLASM OF RIGHT BREAST: ICD-10-CM

## 2024-07-10 DIAGNOSIS — Z12.11 SCREENING FOR COLON CANCER: ICD-10-CM

## 2024-07-10 PROCEDURE — 1123F ACP DISCUSS/DSCN MKR DOCD: CPT | Performed by: SURGERY

## 2024-07-10 PROCEDURE — 99204 OFFICE O/P NEW MOD 45 MIN: CPT | Performed by: SURGERY

## 2024-07-11 DIAGNOSIS — D24.1 INTRADUCTAL PAPILLOMA OF BREAST, RIGHT: Primary | ICD-10-CM

## 2024-07-11 RX ORDER — SODIUM CHLORIDE 0.9 % (FLUSH) 0.9 %
5-40 SYRINGE (ML) INJECTION PRN
Status: CANCELLED | OUTPATIENT
Start: 2024-07-11

## 2024-07-11 RX ORDER — SODIUM CHLORIDE 0.9 % (FLUSH) 0.9 %
5-40 SYRINGE (ML) INJECTION EVERY 12 HOURS SCHEDULED
Status: CANCELLED | OUTPATIENT
Start: 2024-07-11

## 2024-07-11 RX ORDER — SODIUM CHLORIDE 9 MG/ML
INJECTION, SOLUTION INTRAVENOUS PRN
Status: CANCELLED | OUTPATIENT
Start: 2024-07-11

## 2024-08-10 PROCEDURE — 99024 POSTOP FOLLOW-UP VISIT: CPT | Performed by: SURGERY

## 2024-08-10 NOTE — H&P
H&P/Consult Note/Progress Note/Office Note:   Soledad Goldberg  MRN: [id number]  :1955  Age:68 y.o.    HPI: Soledad Goldberg is a 68 y.o. female who is here for right breast NL papilloma excision on 24.      Prior to excision she initially presented with bilateral breast asymmetries on screening mammo.  She then had bilat dx mammo and right breast US followed by right breast US-guided biopsy   identifying fibrocystic mastopathy with intraductal papilloma.     The nodular density in the posterior retroareolar left breast did not persist on the CC spot compression view.  This is shown below.      She is s/p bilateral breast reduction and multiple prior B9 left breast biopsies.   Mother with h/o breast cancer in her 90s.          24 bilat screening mammo with CAD  Hx:    Estimated lifetime risk of breast cancer calculated to be 6.7% based on the Tyrer-Cuzick  model.    American Cancer Society screening recommendation: Women who are at high risk for breast cancer based on certain factors should get a breast MRI and a mammogram every year, typically starting at age 30. This includes women who have a lifetime risk of breast cancer about 20 to 25% or greater, according to risk assessment tools that are based mainly on family history.     BREAST DENSITY: There are scattered areas of fibroglandular density.  (#BDB)     An asymmetry is seen in the central left breast at posterior depth on the cc view.     Possible developing asymmetry is identified in the medial and inferior right breast at posterior depth.     IMPRESSION:  Incomplete examination of the right and the left breast.          24 bilat dx mammo and right breast US  A small nodular density persisted in the lower inner right breast posterior depth on spot compression views.   Incidentally noted is an oval mass in the posterior upper right breast on the ML view.  The nodular density in the posterior retroareolar left breast did not persist on

## 2024-08-10 NOTE — DISCHARGE INSTRUCTIONS
Dressings/Wound Care  Leave dressing alone until follow-up  If the dressing falls off or gets saturated with drainage, OK to change it as needed with dry gauze and tape      Try to keep incision as dry as possible to lower risk of infection.    Activity  No heavy lifting (>5-10lbs) for 6 weeks to reduce risk of swelling.  No driving until you are off pain meds for 24hrs and have no pain with movements associated with driving.    Pain prescription (Norco) electronically sent to your pharmacy    Follow-up with Dr Barraza in approx 2 weeks (8:15am on Monday 8/26/24)  The appt is to be at:  [facility]  [address], [address]  [phone];  option 1 for the [facility] office    Diet  Resume prior diet

## 2024-08-12 ENCOUNTER — APPOINTMENT (OUTPATIENT)
Dept: MAMMOGRAPHY | Age: 69
End: 2024-08-12
Attending: SURGERY
Payer: MEDICARE

## 2024-08-12 ENCOUNTER — ANESTHESIA EVENT (OUTPATIENT)
Dept: SURGERY | Age: 69
End: 2024-08-12
Payer: MEDICARE

## 2024-08-12 ENCOUNTER — ANESTHESIA (OUTPATIENT)
Dept: SURGERY | Age: 69
End: 2024-08-12
Payer: MEDICARE

## 2024-08-12 ENCOUNTER — HOSPITAL ENCOUNTER (OUTPATIENT)
Age: 69
Setting detail: OUTPATIENT SURGERY
Discharge: HOME | End: 2024-08-12
Attending: SURGERY | Admitting: SURGERY
Payer: MEDICARE

## 2024-08-12 VITALS
OXYGEN SATURATION: 97 % | TEMPERATURE: 97.2 F | SYSTOLIC BLOOD PRESSURE: 134 MMHG | HEART RATE: 74 BPM | HEIGHT: 63 IN | RESPIRATION RATE: 12 BRPM | DIASTOLIC BLOOD PRESSURE: 63 MMHG | BODY MASS INDEX: 24.75 KG/M2 | WEIGHT: 139.7 LBS

## 2024-08-12 DIAGNOSIS — D24.1 BENIGN NEOPLASM OF RIGHT BREAST: Primary | ICD-10-CM

## 2024-08-12 DIAGNOSIS — D24.1 INTRADUCTAL PAPILLOMA OF BREAST, RIGHT: ICD-10-CM

## 2024-08-12 PROCEDURE — C1819 TISSUE LOCALIZATION-EXCISION: HCPCS

## 2024-08-12 PROCEDURE — 6360000002 HC RX W HCPCS: Performed by: SURGERY

## 2024-08-12 PROCEDURE — 6370000000 HC RX 637 (ALT 250 FOR IP): Performed by: ANESTHESIOLOGY

## 2024-08-12 PROCEDURE — 2580000003 HC RX 258: Performed by: NURSE ANESTHETIST, CERTIFIED REGISTERED

## 2024-08-12 PROCEDURE — 88307 TISSUE EXAM BY PATHOLOGIST: CPT

## 2024-08-12 PROCEDURE — 76098 X-RAY EXAM SURGICAL SPECIMEN: CPT

## 2024-08-12 PROCEDURE — 19281 PERQ DEVICE BREAST 1ST IMAG: CPT

## 2024-08-12 PROCEDURE — 2500000003 HC RX 250 WO HCPCS: Performed by: NURSE ANESTHETIST, CERTIFIED REGISTERED

## 2024-08-12 PROCEDURE — 7100000011 HC PHASE II RECOVERY - ADDTL 15 MIN: Performed by: SURGERY

## 2024-08-12 PROCEDURE — 2500000003 HC RX 250 WO HCPCS: Performed by: SURGERY

## 2024-08-12 PROCEDURE — 6360000002 HC RX W HCPCS: Performed by: NURSE ANESTHETIST, CERTIFIED REGISTERED

## 2024-08-12 PROCEDURE — 7100000001 HC PACU RECOVERY - ADDTL 15 MIN: Performed by: SURGERY

## 2024-08-12 PROCEDURE — 19125 EXCISION BREAST LESION: CPT | Performed by: SURGERY

## 2024-08-12 PROCEDURE — 7100000010 HC PHASE II RECOVERY - FIRST 15 MIN: Performed by: SURGERY

## 2024-08-12 PROCEDURE — 3700000000 HC ANESTHESIA ATTENDED CARE: Performed by: SURGERY

## 2024-08-12 PROCEDURE — 3600000013 HC SURGERY LEVEL 3 ADDTL 15MIN: Performed by: SURGERY

## 2024-08-12 PROCEDURE — 3700000001 HC ADD 15 MINUTES (ANESTHESIA): Performed by: SURGERY

## 2024-08-12 PROCEDURE — 2709999900 HC NON-CHARGEABLE SUPPLY: Performed by: SURGERY

## 2024-08-12 PROCEDURE — 77065 DX MAMMO INCL CAD UNI: CPT

## 2024-08-12 PROCEDURE — 3600000003 HC SURGERY LEVEL 3 BASE: Performed by: SURGERY

## 2024-08-12 PROCEDURE — 7100000000 HC PACU RECOVERY - FIRST 15 MIN: Performed by: SURGERY

## 2024-08-12 PROCEDURE — 2580000003 HC RX 258: Performed by: ANESTHESIOLOGY

## 2024-08-12 RX ORDER — OXYCODONE HYDROCHLORIDE 5 MG/1
5 TABLET ORAL
Status: DISCONTINUED | OUTPATIENT
Start: 2024-08-12 | End: 2024-08-12 | Stop reason: HOSPADM

## 2024-08-12 RX ORDER — PROCHLORPERAZINE EDISYLATE 5 MG/ML
INJECTION INTRAMUSCULAR; INTRAVENOUS PRN
Status: DISCONTINUED | OUTPATIENT
Start: 2024-08-12 | End: 2024-08-12 | Stop reason: SDUPTHER

## 2024-08-12 RX ORDER — SODIUM CHLORIDE 0.9 % (FLUSH) 0.9 %
5-40 SYRINGE (ML) INJECTION EVERY 12 HOURS SCHEDULED
Status: DISCONTINUED | OUTPATIENT
Start: 2024-08-12 | End: 2024-08-12 | Stop reason: HOSPADM

## 2024-08-12 RX ORDER — SODIUM CHLORIDE 0.9 % (FLUSH) 0.9 %
5-40 SYRINGE (ML) INJECTION PRN
Status: DISCONTINUED | OUTPATIENT
Start: 2024-08-12 | End: 2024-08-12 | Stop reason: HOSPADM

## 2024-08-12 RX ORDER — OXYCODONE HYDROCHLORIDE 5 MG/1
5 TABLET ORAL EVERY 8 HOURS PRN
Qty: 21 TABLET | Refills: 0 | Status: SHIPPED | OUTPATIENT
Start: 2024-08-12 | End: 2024-08-19

## 2024-08-12 RX ORDER — SODIUM CHLORIDE 9 MG/ML
INJECTION, SOLUTION INTRAVENOUS PRN
Status: DISCONTINUED | OUTPATIENT
Start: 2024-08-12 | End: 2024-08-12 | Stop reason: HOSPADM

## 2024-08-12 RX ORDER — PROCHLORPERAZINE EDISYLATE 5 MG/ML
5 INJECTION INTRAMUSCULAR; INTRAVENOUS
Status: DISCONTINUED | OUTPATIENT
Start: 2024-08-12 | End: 2024-08-12 | Stop reason: HOSPADM

## 2024-08-12 RX ORDER — DEXAMETHASONE SODIUM PHOSPHATE 10 MG/ML
INJECTION INTRAMUSCULAR; INTRAVENOUS PRN
Status: DISCONTINUED | OUTPATIENT
Start: 2024-08-12 | End: 2024-08-12 | Stop reason: SDUPTHER

## 2024-08-12 RX ORDER — ACETAMINOPHEN 500 MG
1000 TABLET ORAL ONCE
Status: COMPLETED | OUTPATIENT
Start: 2024-08-12 | End: 2024-08-12

## 2024-08-12 RX ORDER — SODIUM CHLORIDE, SODIUM LACTATE, POTASSIUM CHLORIDE, CALCIUM CHLORIDE 600; 310; 30; 20 MG/100ML; MG/100ML; MG/100ML; MG/100ML
INJECTION, SOLUTION INTRAVENOUS CONTINUOUS PRN
Status: DISCONTINUED | OUTPATIENT
Start: 2024-08-12 | End: 2024-08-12 | Stop reason: SDUPTHER

## 2024-08-12 RX ORDER — KETOROLAC TROMETHAMINE 30 MG/ML
INJECTION, SOLUTION INTRAMUSCULAR; INTRAVENOUS PRN
Status: DISCONTINUED | OUTPATIENT
Start: 2024-08-12 | End: 2024-08-12 | Stop reason: SDUPTHER

## 2024-08-12 RX ORDER — EPHEDRINE SULFATE/0.9% NACL/PF 50 MG/5 ML
SYRINGE (ML) INTRAVENOUS PRN
Status: DISCONTINUED | OUTPATIENT
Start: 2024-08-12 | End: 2024-08-12 | Stop reason: SDUPTHER

## 2024-08-12 RX ORDER — FENTANYL CITRATE 50 UG/ML
INJECTION, SOLUTION INTRAMUSCULAR; INTRAVENOUS PRN
Status: DISCONTINUED | OUTPATIENT
Start: 2024-08-12 | End: 2024-08-12 | Stop reason: SDUPTHER

## 2024-08-12 RX ORDER — MIDAZOLAM HYDROCHLORIDE 2 MG/2ML
2 INJECTION, SOLUTION INTRAMUSCULAR; INTRAVENOUS
Status: DISCONTINUED | OUTPATIENT
Start: 2024-08-12 | End: 2024-08-12 | Stop reason: HOSPADM

## 2024-08-12 RX ORDER — ONDANSETRON 2 MG/ML
INJECTION INTRAMUSCULAR; INTRAVENOUS PRN
Status: DISCONTINUED | OUTPATIENT
Start: 2024-08-12 | End: 2024-08-12 | Stop reason: SDUPTHER

## 2024-08-12 RX ORDER — MIDAZOLAM HYDROCHLORIDE 1 MG/ML
INJECTION INTRAMUSCULAR; INTRAVENOUS PRN
Status: DISCONTINUED | OUTPATIENT
Start: 2024-08-12 | End: 2024-08-12 | Stop reason: SDUPTHER

## 2024-08-12 RX ORDER — LIDOCAINE HYDROCHLORIDE 10 MG/ML
5 INJECTION, SOLUTION INFILTRATION; PERINEURAL ONCE
Status: COMPLETED | OUTPATIENT
Start: 2024-08-12 | End: 2024-08-12

## 2024-08-12 RX ORDER — LIDOCAINE HYDROCHLORIDE 10 MG/ML
1 INJECTION, SOLUTION INFILTRATION; PERINEURAL
Status: DISCONTINUED | OUTPATIENT
Start: 2024-08-12 | End: 2024-08-12 | Stop reason: HOSPADM

## 2024-08-12 RX ORDER — NALOXONE HYDROCHLORIDE 0.4 MG/ML
INJECTION, SOLUTION INTRAMUSCULAR; INTRAVENOUS; SUBCUTANEOUS PRN
Status: DISCONTINUED | OUTPATIENT
Start: 2024-08-12 | End: 2024-08-12 | Stop reason: HOSPADM

## 2024-08-12 RX ORDER — SODIUM CHLORIDE, SODIUM LACTATE, POTASSIUM CHLORIDE, CALCIUM CHLORIDE 600; 310; 30; 20 MG/100ML; MG/100ML; MG/100ML; MG/100ML
INJECTION, SOLUTION INTRAVENOUS CONTINUOUS
Status: DISCONTINUED | OUTPATIENT
Start: 2024-08-12 | End: 2024-08-12 | Stop reason: HOSPADM

## 2024-08-12 RX ORDER — BUPIVACAINE HYDROCHLORIDE 2.5 MG/ML
INJECTION, SOLUTION EPIDURAL; INFILTRATION; INTRACAUDAL PRN
Status: DISCONTINUED | OUTPATIENT
Start: 2024-08-12 | End: 2024-08-12 | Stop reason: HOSPADM

## 2024-08-12 RX ADMIN — DEXAMETHASONE SODIUM PHOSPHATE 8 MG: 10 INJECTION INTRAMUSCULAR; INTRAVENOUS at 13:22

## 2024-08-12 RX ADMIN — FENTANYL CITRATE 25 MCG: 50 INJECTION, SOLUTION INTRAMUSCULAR; INTRAVENOUS at 13:51

## 2024-08-12 RX ADMIN — FENTANYL CITRATE 25 MCG: 50 INJECTION, SOLUTION INTRAMUSCULAR; INTRAVENOUS at 13:04

## 2024-08-12 RX ADMIN — KETOROLAC TROMETHAMINE 15 MG: 30 INJECTION, SOLUTION INTRAMUSCULAR; INTRAVENOUS at 13:56

## 2024-08-12 RX ADMIN — FENTANYL CITRATE 25 MCG: 50 INJECTION, SOLUTION INTRAMUSCULAR; INTRAVENOUS at 13:22

## 2024-08-12 RX ADMIN — PHENYLEPHRINE HYDROCHLORIDE 100 MCG: 0.1 INJECTION, SOLUTION INTRAVENOUS at 13:13

## 2024-08-12 RX ADMIN — MIDAZOLAM 1 MG: 1 INJECTION INTRAMUSCULAR; INTRAVENOUS at 13:01

## 2024-08-12 RX ADMIN — PROCHLORPERAZINE EDISYLATE 5 MG: 5 INJECTION INTRAMUSCULAR; INTRAVENOUS at 13:27

## 2024-08-12 RX ADMIN — ACETAMINOPHEN 1000 MG: 500 TABLET, FILM COATED ORAL at 10:20

## 2024-08-12 RX ADMIN — ONDANSETRON 4 MG: 2 INJECTION INTRAMUSCULAR; INTRAVENOUS at 13:59

## 2024-08-12 RX ADMIN — ONDANSETRON 4 MG: 2 INJECTION INTRAMUSCULAR; INTRAVENOUS at 13:13

## 2024-08-12 RX ADMIN — Medication 5 MG: at 13:24

## 2024-08-12 RX ADMIN — SODIUM CHLORIDE, SODIUM LACTATE, POTASSIUM CHLORIDE, AND CALCIUM CHLORIDE: 600; 310; 30; 20 INJECTION, SOLUTION INTRAVENOUS at 13:58

## 2024-08-12 RX ADMIN — FENTANYL CITRATE 25 MCG: 50 INJECTION, SOLUTION INTRAMUSCULAR; INTRAVENOUS at 13:34

## 2024-08-12 RX ADMIN — Medication 2000 MG: at 13:22

## 2024-08-12 RX ADMIN — PHENYLEPHRINE HYDROCHLORIDE 50 MCG: 0.1 INJECTION, SOLUTION INTRAVENOUS at 13:29

## 2024-08-12 RX ADMIN — SODIUM CHLORIDE, POTASSIUM CHLORIDE, SODIUM LACTATE AND CALCIUM CHLORIDE: 600; 310; 30; 20 INJECTION, SOLUTION INTRAVENOUS at 10:25

## 2024-08-12 RX ADMIN — LIDOCAINE HYDROCHLORIDE 5 ML: 10 INJECTION, SOLUTION INFILTRATION; PERINEURAL at 11:22

## 2024-08-12 RX ADMIN — Medication 5 MG: at 13:29

## 2024-08-12 RX ADMIN — SODIUM CHLORIDE, SODIUM LACTATE, POTASSIUM CHLORIDE, AND CALCIUM CHLORIDE: 600; 310; 30; 20 INJECTION, SOLUTION INTRAVENOUS at 12:57

## 2024-08-12 RX ADMIN — LIDOCAINE HYDROCHLORIDE 2 ML: 10 INJECTION, SOLUTION INFILTRATION; PERINEURAL at 12:21

## 2024-08-12 RX ADMIN — Medication 10 MG: at 13:13

## 2024-08-12 ASSESSMENT — PAIN - FUNCTIONAL ASSESSMENT
PAIN_FUNCTIONAL_ASSESSMENT: 0-10
PAIN_FUNCTIONAL_ASSESSMENT: 0-10

## 2024-08-12 ASSESSMENT — PAIN DESCRIPTION - DESCRIPTORS: DESCRIPTORS: PRESSURE

## 2024-08-12 NOTE — OP NOTE
[place] [address]  [phone]    OPERATVE REPORT    Name: Soledad Goldberg     Date of Surgery: 8/12/2024  Med Record Number: [id number]   Age: 68 y.o.   Sex: female       Pre-operative Diagnosis: Right breast papilloma with clip migration (radiology placed 2 wire localization wires pre-op)    Post-operative Diagnosis: same    Procedure:  Right NL lumpectomy for excision of breast papilloma (CPT 86274-RS)    Surgeon: BRIANNA TUCKER MD  Asistants: none  Anesthesia:  General  Complications: none  Specimen:  Right lumpectomy to path    Estimated Blood Loss: <10cc  Drains: none       Findings:  Infection Present At Time Of Surgery (PATOS) (choose all levels that have infection present):  No infection present        Procedure Description:     The risks, benefits, potential complications, treatment options, and expected outcomes were discussed with the patient pre-operatively.  The patient voiced understanding and gave informed consent preoperatively.  The patient was taken to the Operating Room, and the [facility] time-out protocol checklist was followed.   After the induction of adequate anesthesia, the breast and axillae were prepped and draped in the usual sterile fashion.  Preoperative antibiotics were given.      A right breast incision was made between the 2 guidewire exit sites of the upper right breast.  We performed a right NL lumpectomy which included the prior biopsy clip and the breast papilloma biopsy site by dissecting and excising the breast tissue around the distal aspect of the guidewires.    We marked the specimen for orientation with a short suture at the superior margins and a long suture at the lateral margin  The specimen was imaged in 2 views, contained the 2 guidewires, the biopsy clip, the radiographic target, was approved by radiology, and sent to pathology for review.  Irrigation was used.  Hemostasis was confirmed.    The partial mastectomy  site was inspected.    There was no evidence of gross or palpable disease remaining.    A local anesthetic was given.    The incision was closed with interrupted deep dermal 3-0 Vicryl sutures.    The skin was closed with clips.    A sterile dressing was placed at the end of the case.    The patient was taken to the recovery room in good condition.   She tolerated the procedure well.              BRIANNA TUCKER MD, FACS

## 2024-08-12 NOTE — ANESTHESIA PRE PROCEDURE
Department of Anesthesiology  Preprocedure Note       Name:  Soledad Goldberg   Age:  68 y.o.  :  1955                                          MRN:  [id number]         Date:  2024      Surgeon: Surgeon(s):  Preston Barraza MD    Procedure: Procedure(s):  RIGHT BREAST LUMPECTOMY WITH NEEDLE LOC PreOp:         9:30 am  Loc:             11:00 am  OR:              1:00 pm    Medications prior to admission:   Prior to Admission medications    Medication Sig Start Date End Date Taking? Authorizing Provider   aspirin 81 MG EC tablet Take 1 tablet by mouth at bedtime   Yes Karina Yu MD   progesterone (PROMETRIUM) 200 MG CAPS capsule Take 1 capsule by mouth at bedtime   Yes Karina Yu MD   liothyronine (CYTOMEL) 5 MCG tablet TAKE 1 TABLET BY MOUTH EVERY MORNING ON AN EMPTY STOMACH 22  Yes Karina Yu MD   levothyroxine (SYNTHROID) 50 MCG tablet TAKE 1 TABLET BY MOUTH EVERY DAY BEFORE BREAKFAST 22  Yes Queenie Pacheco, APRN - NP   vitamin D (CHOLECALCIFEROL) 92917 UNIT CAPS Take 1 capsule by mouth every 7 days   Yes ProviderKarina MD   estradiol (ESTRACE VAGINAL) 0.1 MG/GM vaginal cream Place 1 g vaginally daily 22  Yes Harika Cisneros MD   LORazepam (ATIVAN) 0.5 MG tablet Take 0.5 tablets by mouth nightly as needed for Anxiety for up to 30 days. Max Daily Amount: 0.25 mg  Patient not taking: Reported on 2024  Angi Taylor, APRN - CNP       Current medications:    Current Facility-Administered Medications   Medication Dose Route Frequency Provider Last Rate Last Admin   • lidocaine 1 % injection 1 mL  1 mL IntraDERmal Once PRN Sheyla Brown MD       • lactated ringers IV soln infusion   IntraVENous Continuous Sheyla Brown  mL/hr at 24 1025 New Bag at 24 1025   • sodium chloride flush 0.9 % injection 5-40 mL  5-40 mL IntraVENous 2 times per day Sheyla Brown MD       • sodium

## 2024-08-12 NOTE — ANESTHESIA POSTPROCEDURE EVALUATION
Department of Anesthesiology  Postprocedure Note    Patient: Soledad Goldberg  MRN: [id number]  YOB: 1955  Date of evaluation: 8/12/2024    Procedure Summary       Date: 08/12/24 Room / Location: [facility] MAIN OR 03 / [facility] MAIN OR    Anesthesia Start: 1257 Anesthesia Stop: 1409    Procedure: RIGHT BREAST LUMPECTOMY WITH NEEDLE LOC PreOp:         9:30 am  Loc:             11:00 am  OR:              1:00 pm (Right: Breast) Diagnosis:       Benign neoplasm of right breast      (Benign neoplasm of right breast [D24.1])    Surgeons: Preston Barraza MD Responsible Provider: Mack Capellan MD    Anesthesia Type: general ASA Status: 3            Anesthesia Type: No value filed.    Moris Phase I: Moris Score: 10    Moris Phase II: Moris Score: 10    Anesthesia Post Evaluation    Patient location during evaluation: PACU  Patient participation: complete - patient participated  Level of consciousness: awake and alert  Airway patency: patent  Nausea & Vomiting: no nausea and no vomiting  Cardiovascular status: hemodynamically stable  Respiratory status: acceptable, nonlabored ventilation and spontaneous ventilation  Hydration status: euvolemic  Comments: /63   Pulse 74   Temp 97.2 °F (36.2 °C)   Resp 12   Ht 1.6 m (5' 3\")   Wt 63.4 kg (139 lb 11.2 oz)   LMP 01/01/1999   SpO2 97%   BMI 24.75 kg/m²     Multimodal analgesia pain management approach  Pain management: adequate and satisfactory to patient    No notable events documented.

## 2024-08-22 ENCOUNTER — TELEPHONE (OUTPATIENT)
Dept: GASTROENTEROLOGY | Age: 69
End: 2024-08-22

## 2024-08-22 PROBLEM — Z12.11 SCREENING FOR COLON CANCER: Status: ACTIVE | Noted: 2024-07-10

## 2024-08-22 RX ORDER — SODIUM CHLORIDE 0.9 % (FLUSH) 0.9 %
5-40 SYRINGE (ML) INJECTION EVERY 12 HOURS SCHEDULED
OUTPATIENT
Start: 2024-08-22

## 2024-08-22 RX ORDER — SODIUM CHLORIDE 9 MG/ML
25 INJECTION, SOLUTION INTRAVENOUS PRN
OUTPATIENT
Start: 2024-08-22

## 2024-08-22 RX ORDER — SODIUM CHLORIDE 0.9 % (FLUSH) 0.9 %
5-40 SYRINGE (ML) INJECTION PRN
OUTPATIENT
Start: 2024-08-22

## 2024-08-22 NOTE — TELEPHONE ENCOUNTER
Scheduled colonoscopy, mailed prep instructions  Please send Suprep and Dulcolax ( 4 tablets) into patient pharmacy  Pharmacy verified        GASTROENTEROLOGY  SUPREP PREP    You are scheduled for a colonoscopy on 10/17/2024 arrive by 5:30 am   Your procedure is scheduled with      Items to purchase at pharmacy several days before your test:  SUPREP  DULCOLAX TABLETS     The Day before your test: 10/16/2024    Clear liquids only the entire day (water, coffee, tea, soda, Jell-O, ice popsicles, broth, apple juice, etc.) NO MILK, CREAMER, OR DAIRY PRODUCTS. NO SOLID FOODS. NOTHING RED, BLUE, OR PURPLE IN COLOR.  At 3:00 pm take 4x Dulcolax tablets with 16 oz of water  At 5:00 pm - Pour ONE 6 oz bottle of Suprep liquid into the mixing container. Add cold water to the 16 oz line and mix. Drink all the solution over 10-15 minutes.   DRINK- two more 16 oz glasses of water over the next 1 hour.   NO MORE LIQUIDS AFTER MIDNIGHT (other than 2ND part of MiraLAX solution)     The day of your test: 10/17/2024    6 HOURS PRIOR TO PROCEDURE- Pour ONE 6 oz bottle of Suprep liquid into the mixing container. Add cold water to the 16 oz line and mix. Drink all the solution over 10-15 minutes.   DRINK- two more 16 oz glasses of water over the next 1 hour.     NO MORE LIQUIDS UNTIL AFTER PROCEDURE - If you have problems with your prep, call [phone]. ( AJIT)     ** IMPORTANT: IF YOU DO NOT FOLLOW THESE DIRECTIONS, YOUR COLONOSCOPY COULD BE CANCELED DUE TO HAVING AN UNCLEAR PREP**    GETTING READY FOR YOUR COLONOSCOPY  You will need to arrive at:         [address] [phone]    You will need to  your bowel prep from your local pharmacy.    You must arrange for someone 18 years or older to come with you to and from your colonoscopy and stay during your visit to drive you home. Your colonoscopy may need to be rescheduled if you do not have a

## 2024-08-26 ENCOUNTER — OFFICE VISIT (OUTPATIENT)
Dept: SURGERY | Age: 69
End: 2024-08-26

## 2024-08-26 VITALS — WEIGHT: 139 LBS | BODY MASS INDEX: 24.63 KG/M2 | HEIGHT: 63 IN

## 2024-08-26 DIAGNOSIS — D24.1 INTRADUCTAL PAPILLOMA OF BREAST, RIGHT: Primary | ICD-10-CM

## 2024-08-26 DIAGNOSIS — Z12.11 ENCOUNTER FOR SCREENING COLONOSCOPY: Primary | ICD-10-CM

## 2024-08-26 PROCEDURE — 99024 POSTOP FOLLOW-UP VISIT: CPT | Performed by: SURGERY

## 2024-08-26 RX ORDER — SODIUM, POTASSIUM,MAG SULFATES 17.5-3.13G
1 SOLUTION, RECONSTITUTED, ORAL ORAL ONCE
Qty: 1 EACH | Refills: 0 | Status: SHIPPED | OUTPATIENT
Start: 2024-08-26 | End: 2024-08-26

## 2024-08-26 RX ORDER — BISACODYL 5 MG/1
20 TABLET, DELAYED RELEASE ORAL ONCE
Qty: 4 TABLET | Refills: 0 | Status: SHIPPED | OUTPATIENT
Start: 2024-08-26 | End: 2024-08-26

## 2024-08-26 NOTE — PROGRESS NOTES
H&P/Consult Note/Progress Note/Office Note:   Soledad Goldberg  MRN: [id number]  :1955  Age:68 y.o.    HPI: Soledad Goldberg is a 68 y.o. female who is s/p right breast NL papilloma excision on 24.    Her path report showed her prior biopsy site with no residual papilloma and no evidence of malignancy.          Prior to excision she initially presented with bilateral breast asymmetries on screening mammo.  She then had bilat dx mammo and right breast US followed by right breast US-guided biopsy   identifying fibrocystic mastopathy with intraductal papilloma.     The nodular density in the posterior retroareolar left breast did not persist on the CC spot compression view.  This is shown below.      She is s/p bilateral breast reduction and multiple prior B9 left breast biopsies.   Mother with h/o breast cancer in her 90s.          24 bilat screening mammo with CAD  Hx:    Estimated lifetime risk of breast cancer calculated to be 6.7% based on the Tyrer-Cuzick  model.    American [employer] Society screening recommendation: Women who are at high risk for breast cancer based on certain factors should get a breast MRI and a mammogram every year, typically starting at age 30. This includes women who have a lifetime risk of breast cancer about 20 to 25% or greater, according to risk assessment tools that are based mainly on family history.     BREAST DENSITY: There are scattered areas of fibroglandular density.  (#BDB)     An asymmetry is seen in the central left breast at posterior depth on the cc view.     Possible developing asymmetry is identified in the medial and inferior right breast at posterior depth.     IMPRESSION:  Incomplete examination of the right and the left breast.          24 bilat dx mammo and right breast US  A small nodular density persisted in the lower inner right breast posterior depth on spot compression views.   Incidentally noted is an oval mass in the posterior upper right  historian(s)  ?Any combination of 3 from the following:   ?Review of prior external note(s) from each unique source*;  ?Review of the result(s) of each unique test*;  ?Ordering of each unique test*;  ?Assessment requiring an independent historian(s)    or  Category 2: Independent interpretation of tests   ?Independent interpretation of a test performed by another physician/other qualified health care professional (not separately reported);     or  Category 3: Discussion of management or test interpretation  ?Discussion of management or test interpretation with external physician/other qualified health care professional/appropriate source (not separately reported)   Moderate risk of morbidity from additional diagnostic testing or treatment  Examples only:  ?Prescription drug management   ?Decision regarding minor surgery with identified patient or procedure risk factors  ?Decision regarding elective major surgery without identified patient or procedure risk factors   ?Diagnosis or treatment significantly limited by social determinants of health       78103  81400 High High  ?1or more chronic illnesses with severe exacerbation, progression, or side effects of treatment;    or  ?1 acute or chronic illness or injury that poses a threat to life or bodily function   Extensive  (Must meet the requirements of at least 2 out of 3 categories)  Category 1: Tests, documents, or independent historian(s)  ?Any combination of 3 from the following:   ?Review of prior external note(s) from each unique source*;  ?Review of the result(s) of each unique test*;   ?Ordering of each unique test*;   ?Assessment requiring an independent historian(s)    or   Category 2: Independent interpretation of tests   ?Independent interpretation of a test performed by another physician/other qualified health care professional (not separately reported);     or  Category 3: Discussion of management or test interpretation  ?Discussion of management or

## 2024-09-21 PROBLEM — Z12.11 SCREENING FOR COLON CANCER: Status: RESOLVED | Noted: 2024-07-10 | Resolved: 2024-09-21

## 2024-10-08 ENCOUNTER — TELEPHONE (OUTPATIENT)
Dept: GASTROENTEROLOGY | Age: 69
End: 2024-10-08

## 2024-10-08 PROBLEM — Z12.11 SCREENING FOR COLON CANCER: Status: ACTIVE | Noted: 2024-07-10

## 2024-10-08 NOTE — TELEPHONE ENCOUNTER
Patient called to reschedule colonoscopy  Cx procedure 10/17  Rs procedure 10/25, pt has prep instructions on hand

## 2024-10-08 NOTE — TELEPHONE ENCOUNTER
Patient left voicemail regarding colonoscopy that is on 10/17. Returned patients call and left voicemail for clarification on what patient was needing in the voicemail.

## 2024-10-15 ENCOUNTER — TELEPHONE (OUTPATIENT)
Dept: GASTROENTEROLOGY | Age: 69
End: 2024-10-15

## 2024-11-07 PROBLEM — Z12.11 SCREENING FOR COLON CANCER: Status: RESOLVED | Noted: 2024-07-10 | Resolved: 2024-11-07

## 2024-12-16 PROBLEM — Z12.11 SCREENING FOR COLON CANCER: Status: ACTIVE | Noted: 2024-07-10

## 2025-01-03 ENCOUNTER — TRANSCRIBE ORDERS (OUTPATIENT)
Dept: SCHEDULING | Age: 70
End: 2025-01-03

## 2025-01-03 DIAGNOSIS — Z78.0 ASYMPTOMATIC MENOPAUSAL STATE: Primary | ICD-10-CM

## 2025-01-06 ENCOUNTER — TELEPHONE (OUTPATIENT)
Dept: GASTROENTEROLOGY | Age: 70
End: 2025-01-06

## 2025-01-06 RX ORDER — LEVOTHYROXINE SODIUM 75 UG/1
75 TABLET ORAL DAILY
COMMUNITY

## 2025-01-06 NOTE — PERIOP NOTE
amount when having a bowel movement. Occasional specks of blood with bowel movement would not be unusual.

## 2025-01-06 NOTE — TELEPHONE ENCOUNTER
Patient lvm requesting a call back in ref to prep questions  Returned patient call and lvm to return call if assistance is still needed

## 2025-01-08 ENCOUNTER — TELEPHONE (OUTPATIENT)
Dept: GASTROENTEROLOGY | Age: 70
End: 2025-01-08

## 2025-01-09 ENCOUNTER — TELEPHONE (OUTPATIENT)
Dept: GASTROENTEROLOGY | Age: 70
End: 2025-01-09

## 2025-01-09 ENCOUNTER — ANESTHESIA EVENT (OUTPATIENT)
Dept: ENDOSCOPY | Age: 70
End: 2025-01-09
Payer: MEDICARE

## 2025-01-09 RX ORDER — NALOXONE HYDROCHLORIDE 0.4 MG/ML
INJECTION, SOLUTION INTRAMUSCULAR; INTRAVENOUS; SUBCUTANEOUS PRN
Status: CANCELLED | OUTPATIENT
Start: 2025-01-09

## 2025-01-09 NOTE — PROGRESS NOTES
Unable to reach patient at this time to confirm arrival time and procedure. Voicemail left and callback number provided. MyChart pending unable to send message.

## 2025-01-10 ENCOUNTER — HOSPITAL ENCOUNTER (OUTPATIENT)
Age: 70
Discharge: HOME | End: 2025-01-10
Attending: INTERNAL MEDICINE | Admitting: INTERNAL MEDICINE
Payer: MEDICARE

## 2025-01-10 ENCOUNTER — ANESTHESIA (OUTPATIENT)
Dept: ENDOSCOPY | Age: 70
End: 2025-01-10
Payer: MEDICARE

## 2025-01-10 VITALS
OXYGEN SATURATION: 96 % | WEIGHT: 140 LBS | RESPIRATION RATE: 22 BRPM | SYSTOLIC BLOOD PRESSURE: 125 MMHG | HEIGHT: 63 IN | HEART RATE: 66 BPM | DIASTOLIC BLOOD PRESSURE: 74 MMHG | BODY MASS INDEX: 24.8 KG/M2 | TEMPERATURE: 98.8 F

## 2025-01-10 PROCEDURE — 3609027000 HC COLONOSCOPY: Performed by: INTERNAL MEDICINE

## 2025-01-10 PROCEDURE — 7100000011 HC PHASE II RECOVERY - ADDTL 15 MIN: Performed by: INTERNAL MEDICINE

## 2025-01-10 PROCEDURE — G0121 COLON CA SCRN NOT HI RSK IND: HCPCS | Performed by: INTERNAL MEDICINE

## 2025-01-10 PROCEDURE — 7100000010 HC PHASE II RECOVERY - FIRST 15 MIN: Performed by: INTERNAL MEDICINE

## 2025-01-10 PROCEDURE — 2580000003 HC RX 258: Performed by: STUDENT IN AN ORGANIZED HEALTH CARE EDUCATION/TRAINING PROGRAM

## 2025-01-10 PROCEDURE — 3700000000 HC ANESTHESIA ATTENDED CARE: Performed by: INTERNAL MEDICINE

## 2025-01-10 PROCEDURE — 2709999900 HC NON-CHARGEABLE SUPPLY: Performed by: INTERNAL MEDICINE

## 2025-01-10 PROCEDURE — 6360000002 HC RX W HCPCS

## 2025-01-10 RX ORDER — SODIUM CHLORIDE 0.9 % (FLUSH) 0.9 %
5-40 SYRINGE (ML) INJECTION PRN
Status: DISCONTINUED | OUTPATIENT
Start: 2025-01-10 | End: 2025-01-10 | Stop reason: HOSPADM

## 2025-01-10 RX ORDER — SODIUM CHLORIDE 0.9 % (FLUSH) 0.9 %
5-40 SYRINGE (ML) INJECTION EVERY 12 HOURS SCHEDULED
Status: DISCONTINUED | OUTPATIENT
Start: 2025-01-10 | End: 2025-01-10 | Stop reason: HOSPADM

## 2025-01-10 RX ORDER — SODIUM CHLORIDE, SODIUM LACTATE, POTASSIUM CHLORIDE, CALCIUM CHLORIDE 600; 310; 30; 20 MG/100ML; MG/100ML; MG/100ML; MG/100ML
INJECTION, SOLUTION INTRAVENOUS CONTINUOUS
Status: DISCONTINUED | OUTPATIENT
Start: 2025-01-10 | End: 2025-01-10 | Stop reason: HOSPADM

## 2025-01-10 RX ORDER — PROPOFOL 10 MG/ML
INJECTION, EMULSION INTRAVENOUS
Status: DISCONTINUED | OUTPATIENT
Start: 2025-01-10 | End: 2025-01-10 | Stop reason: SDUPTHER

## 2025-01-10 RX ORDER — SODIUM CHLORIDE 9 MG/ML
INJECTION, SOLUTION INTRAVENOUS PRN
Status: DISCONTINUED | OUTPATIENT
Start: 2025-01-10 | End: 2025-01-10 | Stop reason: HOSPADM

## 2025-01-10 RX ORDER — LIDOCAINE HYDROCHLORIDE 20 MG/ML
INJECTION, SOLUTION EPIDURAL; INFILTRATION; INTRACAUDAL; PERINEURAL
Status: DISCONTINUED | OUTPATIENT
Start: 2025-01-10 | End: 2025-01-10 | Stop reason: SDUPTHER

## 2025-01-10 RX ORDER — SODIUM CHLORIDE 9 MG/ML
25 INJECTION, SOLUTION INTRAVENOUS PRN
Status: DISCONTINUED | OUTPATIENT
Start: 2025-01-10 | End: 2025-01-10 | Stop reason: HOSPADM

## 2025-01-10 RX ORDER — ONDANSETRON 2 MG/ML
INJECTION INTRAMUSCULAR; INTRAVENOUS
Status: DISCONTINUED | OUTPATIENT
Start: 2025-01-10 | End: 2025-01-10 | Stop reason: SDUPTHER

## 2025-01-10 RX ORDER — LIDOCAINE HYDROCHLORIDE 10 MG/ML
1 INJECTION, SOLUTION INFILTRATION; PERINEURAL
Status: DISCONTINUED | OUTPATIENT
Start: 2025-01-10 | End: 2025-01-10 | Stop reason: HOSPADM

## 2025-01-10 RX ADMIN — PROPOFOL 150 MCG/KG/MIN: 10 INJECTION, EMULSION INTRAVENOUS at 07:18

## 2025-01-10 RX ADMIN — LIDOCAINE HYDROCHLORIDE 100 MG: 20 INJECTION, SOLUTION EPIDURAL; INFILTRATION; INTRACAUDAL; PERINEURAL at 07:18

## 2025-01-10 RX ADMIN — ONDANSETRON 4 MG: 2 INJECTION INTRAMUSCULAR; INTRAVENOUS at 07:22

## 2025-01-10 RX ADMIN — SODIUM CHLORIDE, SODIUM LACTATE, POTASSIUM CHLORIDE, AND CALCIUM CHLORIDE: 600; 310; 30; 20 INJECTION, SOLUTION INTRAVENOUS at 07:18

## 2025-01-10 ASSESSMENT — ENCOUNTER SYMPTOMS
SHORTNESS OF BREATH: 0
DIARRHEA: 0
COLOR CHANGE: 0
NAUSEA: 0
ABDOMINAL PAIN: 0
BLOOD IN STOOL: 0
VOMITING: 0
CONSTIPATION: 0
TROUBLE SWALLOWING: 0
ABDOMINAL DISTENTION: 0
EYES NEGATIVE: 1

## 2025-01-10 ASSESSMENT — PAIN - FUNCTIONAL ASSESSMENT: PAIN_FUNCTIONAL_ASSESSMENT: 0-10

## 2025-01-10 NOTE — H&P
Soledad Goldberg is 69 y.o. y/o female here for screening colonoscopy.    No immediate (parents/siblings) FH of colon cancer, no acute symptoms.     Past Medical History:   Diagnosis Date    Anxiety     Atypical facial pain     localized to rt maxill    Chronic pain     neck, back and shoulders from tension    Chronic sinusitis     Depression     Deviated nasal septum     Esophageal reflux 7/18/2016    Eustachian tube dysfunction     GERD (gastroesophageal reflux disease)     History of blood transfusion 1992    History of mycoplasma pneumonia 1998    History of osteomyelitis     h/o osteomylitis of [id number]- still having problems    History of shingles 2020    Hx of chlamydia infection     treated 1994    Hx of Lyme disease 2002    Hypothyroidism due to Hashimoto's thyroiditis 5/11/2017    Migraine     Mild depression 4/18/2018    Nausea & vomiting     post op nausea and vomiting    Thyroid disease 2004    Hashimoto's    Vaginitis and vulvovaginitis      Past Surgical History:   Procedure Laterality Date    BLADDER SUSPENSION  03/02/2021    BREAST BIOPSY Left 1992, 1999, 2002    benign mass    BREAST BIOPSY Right 8/12/2024    RIGHT BREAST LUMPECTOMY WITH NEEDLE LOC PreOp:         9:30 am  Loc:             11:00 am  OR:              1:00 pm [place] [facility] [place]    BREAST RECONSTRUCTION Bilateral 1992    BREAST REDUCTION SURGERY Bilateral 1992    BREAST SURGERY  1992    reconstruction    BREAST SURGERY  1992,1999,2002    left breast x3    CATARACT REMOVAL Bilateral 2023    COLONOSCOPY  2013    GYN  1981    Tubal Ligation    HEENT  1964    tonsilectomy    HEENT  4/13/05    SEPTO/FESS    HEENT      jaw surg r/t osteomylitits-- 2004    INCONTINENCE SURGERY      2021    OTHER SURGICAL HISTORY Bilateral 06/7/05    Tube Placement    REFRACTIVE SURGERY  2001    JESSIE AND BSO (CERVIX REMOVED)  1999    Hysterectomy & BSO    US BREAST BIOPSY W LOC DEVICE 1ST LESION RIGHT Right 6/26/2024    US

## 2025-01-10 NOTE — ANESTHESIA PRE PROCEDURE
Department of Anesthesiology  Preprocedure Note       Name:  Soledad Goldberg   Age:  69 y.o.  :  1955                                          MRN:  [id number]         Date:  1/10/2025      Surgeon: Surgeon(s):  Preston Gibson DO    Procedure: Procedure(s):  COLORECTAL CANCER SCREENING, NOT HIGH RISK    Medications prior to admission:   Prior to Admission medications    Medication Sig Start Date End Date Taking? Authorizing Provider   levothyroxine (SYNTHROID) 75 MCG tablet Take 1 tablet by mouth Daily   Yes Karina Yu MD   liothyronine (CYTOMEL) 5 MCG tablet Take 1 tablet by mouth in the morning and at bedtime Indications: 1 tablet in morning and 1 tablet at 2:00 pm 22  Yes Karina Yu MD   vitamin D (CHOLECALCIFEROL) 69383 UNIT CAPS Take 1 capsule by mouth every 7 days Indications:    Yes Karina Yu MD   aspirin 81 MG EC tablet Take 1 tablet by mouth at bedtime    Karina Yu MD   progesterone (PROMETRIUM) 200 MG CAPS capsule Take 1 capsule by mouth at bedtime    Karina Yu MD   levothyroxine (SYNTHROID) 50 MCG tablet TAKE 1 TABLET BY MOUTH EVERY DAY BEFORE BREAKFAST  Patient not taking: Reported on 1/10/2025 8/24/22   Queenie Pacheco P, APRN - NP   estradiol (ESTRACE VAGINAL) 0.1 MG/GM vaginal cream Place 1 g vaginally daily  Patient not taking: Reported on 2025   Harika Cisneros MD       Current medications:    Current Facility-Administered Medications   Medication Dose Route Frequency Provider Last Rate Last Admin   • lidocaine 1 % injection 1 mL  1 mL IntraDERmal Once PRN Rich Vivar MD       • lactated ringers infusion   IntraVENous Continuous Rich Vivar MD       • sodium chloride flush 0.9 % injection 5-40 mL  5-40 mL IntraVENous 2 times per day Rich Vivar MD       • sodium chloride flush 0.9 % injection 5-40 mL  5-40 mL IntraVENous PRN Rich Vivar MD       • 0.9 % sodium chloride infusion

## 2025-01-10 NOTE — DISCHARGE INSTRUCTIONS
Gastrointestinal Colonoscopy/Flexible Sigmoidoscopy - Lower Exam Discharge Instructions    Call Dr. Gibson at [phone] for any problems or questions.    Contact the doctor’s office for follow up appointment as directed.    Medication may cause drowsiness for several hours, therefore:  Do not drive or operate machinery for reminder of the day.  No alcohol today.  Do not make any important or legal decisions for 24 hours.  Do not sign any legal documents for 24 hours.    Ordinarily, you may resume regular diet and activity after exam unless otherwise specified by your physician.    Because of air put into your colon during exam, you may experience some abdominal distension, relieved by the passage of gas, for several hours.    Contact your physician if you have any of the following:  Excessive amount of bleeding - large amount when having a bowel movement.  Occasional specks of blood with bowel movement would not be unusual.  Severe abdominal pain  Fever or Chills

## 2025-01-10 NOTE — ANESTHESIA POSTPROCEDURE EVALUATION
Department of Anesthesiology  Postprocedure Note    Patient: Soledad Goldberg  MRN: [id number]  YOB: 1955  Date of evaluation: 1/10/2025    Procedure Summary       Date: 01/10/25 Room / Location: [facility] ENDO 03 / [facility] ENDOSCOPY    Anesthesia Start: 0715 Anesthesia Stop: 0734    Procedure: COLORECTAL CANCER SCREENING, NOT HIGH RISK Diagnosis:       Screening for colon cancer      (Screening for colon cancer [Z12.11])    Surgeons: Preston Gibson DO Responsible Provider: Rich Vivar MD    Anesthesia Type: TIVA ASA Status: 2            Anesthesia Type: No value filed.    Moris Phase I: Moris Score: 10    Moris Phase II: Moris Score: 10    Anesthesia Post Evaluation    Patient location during evaluation: bedside  Patient participation: complete - patient participated  Level of consciousness: awake and alert  Airway patency: patent  Nausea & Vomiting: no vomiting  Cardiovascular status: hemodynamically stable  Respiratory status: acceptable  Hydration status: euvolemic  Pain management: adequate    No notable events documented.

## 2025-01-15 PROBLEM — Z12.11 SCREENING FOR COLON CANCER: Status: RESOLVED | Noted: 2024-07-10 | Resolved: 2025-01-15

## 2025-03-03 ENCOUNTER — HOSPITAL ENCOUNTER (OUTPATIENT)
Dept: MAMMOGRAPHY | Age: 70
Discharge: HOME | End: 2025-03-06
Payer: MEDICARE

## 2025-03-03 DIAGNOSIS — Z78.0 ASYMPTOMATIC MENOPAUSAL STATE: ICD-10-CM

## 2025-03-03 PROCEDURE — 77080 DXA BONE DENSITY AXIAL: CPT

## 2025-07-19 ENCOUNTER — APPOINTMENT (OUTPATIENT)
Dept: GENERAL RADIOLOGY | Age: 70
End: 2025-07-19
Payer: MEDICARE

## 2025-07-19 ENCOUNTER — HOSPITAL ENCOUNTER (EMERGENCY)
Age: 70
Discharge: HOME | End: 2025-07-19
Attending: EMERGENCY MEDICINE
Payer: MEDICARE

## 2025-07-19 VITALS
WEIGHT: 140 LBS | OXYGEN SATURATION: 99 % | DIASTOLIC BLOOD PRESSURE: 64 MMHG | RESPIRATION RATE: 12 BRPM | HEART RATE: 54 BPM | SYSTOLIC BLOOD PRESSURE: 109 MMHG | BODY MASS INDEX: 24.8 KG/M2 | TEMPERATURE: 97.3 F

## 2025-07-19 DIAGNOSIS — R07.9 ACUTE CHEST PAIN: Primary | ICD-10-CM

## 2025-07-19 LAB
ALBUMIN SERPL-MCNC: 3.8 G/DL (ref 3.2–4.6)
ALBUMIN/GLOB SERPL: 1.4 (ref 1–1.9)
ALP SERPL-CCNC: 62 U/L (ref 35–104)
ALT SERPL-CCNC: 33 U/L (ref 8–45)
ANION GAP SERPL CALC-SCNC: 14 MMOL/L (ref 7–16)
AST SERPL-CCNC: 53 U/L (ref 15–37)
BASOPHILS # BLD: 0.04 K/UL (ref 0–0.2)
BASOPHILS NFR BLD: 0.7 % (ref 0–2)
BILIRUB SERPL-MCNC: 0.6 MG/DL (ref 0–1.2)
BUN SERPL-MCNC: 9 MG/DL (ref 8–23)
CALCIUM SERPL-MCNC: 9.3 MG/DL (ref 8.8–10.2)
CHLORIDE SERPL-SCNC: 103 MMOL/L (ref 98–107)
CO2 SERPL-SCNC: 22 MMOL/L (ref 20–29)
CREAT SERPL-MCNC: 0.84 MG/DL (ref 0.6–1.1)
D DIMER PPP FEU-MCNC: 0.42 UG/ML(FEU)
DIFFERENTIAL METHOD BLD: NORMAL
EKG ATRIAL RATE: 82 BPM
EKG DIAGNOSIS: NORMAL
EKG P AXIS: 65 DEGREES
EKG P-R INTERVAL: 171 MS
EKG Q-T INTERVAL: 380 MS
EKG QRS DURATION: 103 MS
EKG QTC CALCULATION (BAZETT): 447 MS
EKG R AXIS: 63 DEGREES
EKG T AXIS: 58 DEGREES
EKG VENTRICULAR RATE: 83 BPM
EOSINOPHIL # BLD: 0.21 K/UL (ref 0–0.8)
EOSINOPHIL NFR BLD: 3.5 % (ref 0.5–7.8)
ERYTHROCYTE [DISTWIDTH] IN BLOOD BY AUTOMATED COUNT: 12.1 % (ref 11.9–14.6)
GLOBULIN SER CALC-MCNC: 2.8 G/DL (ref 2.3–3.5)
GLUCOSE SERPL-MCNC: 89 MG/DL (ref 70–99)
HCT VFR BLD AUTO: 42.1 % (ref 35.8–46.3)
HGB BLD-MCNC: 14.1 G/DL (ref 11.7–15.4)
IMM GRANULOCYTES # BLD AUTO: 0.01 K/UL (ref 0–0.5)
IMM GRANULOCYTES NFR BLD AUTO: 0.2 % (ref 0–5)
LIPASE SERPL-CCNC: 30 U/L (ref 13–60)
LYMPHOCYTES # BLD: 2.42 K/UL (ref 0.5–4.6)
LYMPHOCYTES NFR BLD: 40.8 % (ref 13–44)
MCH RBC QN AUTO: 30.9 PG (ref 26.1–32.9)
MCHC RBC AUTO-ENTMCNC: 33.5 G/DL (ref 31.4–35)
MCV RBC AUTO: 92.3 FL (ref 82–102)
MONOCYTES # BLD: 0.48 K/UL (ref 0.1–1.3)
MONOCYTES NFR BLD: 8.1 % (ref 4–12)
NEUTS SEG # BLD: 2.77 K/UL (ref 1.7–8.2)
NEUTS SEG NFR BLD: 46.7 % (ref 43–78)
NRBC # BLD: 0 K/UL (ref 0–0.2)
PLATELET # BLD AUTO: 282 K/UL (ref 150–450)
PMV BLD AUTO: 10 FL (ref 9.4–12.3)
POTASSIUM SERPL-SCNC: 3.9 MMOL/L (ref 3.5–5.1)
PROT SERPL-MCNC: 6.6 G/DL (ref 6.3–8.2)
RBC # BLD AUTO: 4.56 M/UL (ref 4.05–5.2)
SODIUM SERPL-SCNC: 139 MMOL/L (ref 136–145)
TROPONIN T SERPL HS-MCNC: <6 NG/L (ref 0–14)
TROPONIN T SERPL HS-MCNC: <6 NG/L (ref 0–14)
WBC # BLD AUTO: 5.9 K/UL (ref 4.3–11.1)

## 2025-07-19 PROCEDURE — 80053 COMPREHEN METABOLIC PANEL: CPT

## 2025-07-19 PROCEDURE — 93005 ELECTROCARDIOGRAM TRACING: CPT | Performed by: EMERGENCY MEDICINE

## 2025-07-19 PROCEDURE — 6370000000 HC RX 637 (ALT 250 FOR IP): Performed by: EMERGENCY MEDICINE

## 2025-07-19 PROCEDURE — 85025 COMPLETE CBC W/AUTO DIFF WBC: CPT

## 2025-07-19 PROCEDURE — 85379 FIBRIN DEGRADATION QUANT: CPT

## 2025-07-19 PROCEDURE — 6360000002 HC RX W HCPCS

## 2025-07-19 PROCEDURE — 83690 ASSAY OF LIPASE: CPT

## 2025-07-19 PROCEDURE — 93010 ELECTROCARDIOGRAM REPORT: CPT | Performed by: INTERNAL MEDICINE

## 2025-07-19 PROCEDURE — 71045 X-RAY EXAM CHEST 1 VIEW: CPT

## 2025-07-19 PROCEDURE — 84484 ASSAY OF TROPONIN QUANT: CPT

## 2025-07-19 PROCEDURE — 99285 EMERGENCY DEPT VISIT HI MDM: CPT

## 2025-07-19 RX ORDER — OMEPRAZOLE 20 MG/1
20 CAPSULE, DELAYED RELEASE ORAL
Qty: 14 CAPSULE | Refills: 1 | Status: SHIPPED | OUTPATIENT
Start: 2025-07-19

## 2025-07-19 RX ORDER — NITROGLYCERIN 0.4 MG/1
TABLET SUBLINGUAL
Status: DISCONTINUED
Start: 2025-07-19 | End: 2025-07-19 | Stop reason: HOSPADM

## 2025-07-19 RX ORDER — NITROGLYCERIN 0.4 MG/1
0.4 TABLET SUBLINGUAL EVERY 5 MIN PRN
Status: DISCONTINUED | OUTPATIENT
Start: 2025-07-19 | End: 2025-07-19 | Stop reason: HOSPADM

## 2025-07-19 RX ORDER — MAGNESIUM HYDROXIDE/ALUMINUM HYDROXICE/SIMETHICONE 120; 1200; 1200 MG/30ML; MG/30ML; MG/30ML
30 SUSPENSION ORAL
Status: COMPLETED | OUTPATIENT
Start: 2025-07-19 | End: 2025-07-19

## 2025-07-19 RX ORDER — ONDANSETRON 2 MG/ML
INJECTION INTRAMUSCULAR; INTRAVENOUS
Status: COMPLETED
Start: 2025-07-19 | End: 2025-07-19

## 2025-07-19 RX ORDER — ONDANSETRON 2 MG/ML
4 INJECTION INTRAMUSCULAR; INTRAVENOUS
Status: DISCONTINUED | OUTPATIENT
Start: 2025-07-19 | End: 2025-07-19 | Stop reason: HOSPADM

## 2025-07-19 RX ORDER — NITROGLYCERIN 0.4 MG/1
TABLET SUBLINGUAL
Qty: 25 TABLET | Refills: 1 | Status: SHIPPED | OUTPATIENT
Start: 2025-07-19

## 2025-07-19 RX ADMIN — NITROGLYCERIN 0.4 MG: 0.4 TABLET SUBLINGUAL at 10:15

## 2025-07-19 RX ADMIN — ALUMINUM HYDROXIDE, MAGNESIUM HYDROXIDE, DIMETHICONE 30 ML: 400; 400; 40 SUSPENSION ORAL at 10:10

## 2025-07-19 RX ADMIN — NITROGLYCERIN 0.4 MG: 0.4 TABLET SUBLINGUAL at 10:20

## 2025-07-19 RX ADMIN — ONDANSETRON 4 MG: 2 INJECTION, SOLUTION INTRAMUSCULAR; INTRAVENOUS at 10:10

## 2025-07-19 RX ADMIN — NITROGLYCERIN 0.4 MG: 0.4 TABLET SUBLINGUAL at 10:10

## 2025-07-19 ASSESSMENT — LIFESTYLE VARIABLES
HOW MANY STANDARD DRINKS CONTAINING ALCOHOL DO YOU HAVE ON A TYPICAL DAY: PATIENT DOES NOT DRINK
HOW OFTEN DO YOU HAVE A DRINK CONTAINING ALCOHOL: NEVER

## 2025-07-19 ASSESSMENT — PAIN SCALES - GENERAL: PAINLEVEL_OUTOF10: 8

## 2025-07-19 ASSESSMENT — PAIN - FUNCTIONAL ASSESSMENT: PAIN_FUNCTIONAL_ASSESSMENT: 0-10

## 2025-07-19 NOTE — ED PROVIDER NOTES
Social Drivers of Health     Financial Resource Strain: Low Risk  (5/1/2024)    Overall Financial Resource Strain (CARDIA)     Difficulty of Paying Living Expenses: Not hard at all   Food Insecurity: No Food Insecurity (5/1/2024)    Hunger Vital Sign     Worried About Running Out of Food in the Last Year: Never true     Ran Out of Food in the Last Year: Never true   Transportation Needs: Unknown (5/1/2024)    PRAPARE - Transportation     Lack of Transportation (Non-Medical): No   Housing Stability: Unknown (5/1/2024)    Housing Stability Vital Sign     Unstable Housing in the Last Year: No        Previous Medications    ASPIRIN 81 MG EC TABLET    Take 1 tablet by mouth at bedtime    ESTRADIOL (ESTRACE VAGINAL) 0.1 MG/GM VAGINAL CREAM    Place 1 g vaginally daily    LEVOTHYROXINE (SYNTHROID) 50 MCG TABLET    TAKE 1 TABLET BY MOUTH EVERY DAY BEFORE BREAKFAST    LEVOTHYROXINE (SYNTHROID) 75 MCG TABLET    Take 1 tablet by mouth Daily    LIOTHYRONINE (CYTOMEL) 5 MCG TABLET    Take 1 tablet by mouth in the morning and at bedtime Indications: 1 tablet in morning and 1 tablet at 2:00 pm    PROGESTERONE (PROMETRIUM) 200 MG CAPS CAPSULE    Take 1 capsule by mouth at bedtime    VITAMIN D (CHOLECALCIFEROL) 95661 UNIT CAPS    Take 1 capsule by mouth every 7 days Indications: Mondays        Results from this emergency department visit:      Results for orders placed or performed during the hospital encounter of 07/19/25   XR CHEST PORTABLE    Narrative    Chest X-ray    INDICATION: Chest Pain    COMPARISON: X-ray chest 1 view 7/31/2015    TECHNIQUE: AP/PA view of the chest was obtained.    FINDINGS: The lungs are clear. There are no infiltrates or effusions. There is  no evident pneumothorax. The heart size is normal.  The bony thorax is intact.        Impression    No radiographically evident acute cardiopulmonary disease.      Electronically signed by Sancho Rowe   CBC with Auto Differential   Result Value Ref Range    WBC  QRS Duration 103 ms    Q-T Interval 380 ms    QTc Calculation (Bazett) 447 ms    P Axis 65 degrees    R Axis 63 degrees    T Axis 58 degrees    Diagnosis Sinus rhythm          XR CHEST PORTABLE   Final Result   No radiographically evident acute cardiopulmonary disease.         Electronically signed by Sancho Rowe                   No results for input(s): \"COVID19\" in the last 72 hours.     Voice dictation software was used during the making of this note.  This software is not perfect and grammatical and other typographical errors may be present.  This note has not been completely proofread for errors.     Scout Mayer MD  07/19/25 [id number]

## 2025-07-19 NOTE — ED TRIAGE NOTES
Pt c/o sudden onset severe midsternal chest pain with n/v about 30 minutes ago, denies SOB. Denies significant cardiac hx.

## 2025-07-19 NOTE — DISCHARGE INSTRUCTIONS
Call cardiology if you do not hear from them by Monday morning regarding follow-up appointment.  Start acid medication.  If you have sudden, severe discomfort, consider using nitroglycerin pills and recheck in emergency department or 3 pills of not improved the situation.  Also you call your primary care doctor to recheck.  Also consider rechecking shortness of breath, high fever or other issues

## 2025-08-19 ENCOUNTER — INITIAL CONSULT (OUTPATIENT)
Age: 70
End: 2025-08-19
Payer: MEDICARE

## 2025-08-19 VITALS
SYSTOLIC BLOOD PRESSURE: 121 MMHG | BODY MASS INDEX: 25.16 KG/M2 | HEART RATE: 68 BPM | WEIGHT: 142 LBS | HEIGHT: 63 IN | DIASTOLIC BLOOD PRESSURE: 86 MMHG

## 2025-08-19 DIAGNOSIS — E78.5 DYSLIPIDEMIA: ICD-10-CM

## 2025-08-19 DIAGNOSIS — R07.89 CHEST DISCOMFORT: Primary | ICD-10-CM

## 2025-08-19 PROCEDURE — G9899 SCRN MAM PERF RSLTS DOC: HCPCS | Performed by: INTERNAL MEDICINE

## 2025-08-19 PROCEDURE — 1159F MED LIST DOCD IN RCRD: CPT | Performed by: INTERNAL MEDICINE

## 2025-08-19 PROCEDURE — 1160F RVW MEDS BY RX/DR IN RCRD: CPT | Performed by: INTERNAL MEDICINE

## 2025-08-19 PROCEDURE — G8399 PT W/DXA RESULTS DOCUMENT: HCPCS | Performed by: INTERNAL MEDICINE

## 2025-08-19 PROCEDURE — G8419 CALC BMI OUT NRM PARAM NOF/U: HCPCS | Performed by: INTERNAL MEDICINE

## 2025-08-19 PROCEDURE — 1126F AMNT PAIN NOTED NONE PRSNT: CPT | Performed by: INTERNAL MEDICINE

## 2025-08-19 PROCEDURE — 1090F PRES/ABSN URINE INCON ASSESS: CPT | Performed by: INTERNAL MEDICINE

## 2025-08-19 PROCEDURE — G8427 DOCREV CUR MEDS BY ELIG CLIN: HCPCS | Performed by: INTERNAL MEDICINE

## 2025-08-19 PROCEDURE — 99204 OFFICE O/P NEW MOD 45 MIN: CPT | Performed by: INTERNAL MEDICINE

## 2025-08-19 PROCEDURE — 1123F ACP DISCUSS/DSCN MKR DOCD: CPT | Performed by: INTERNAL MEDICINE

## 2025-08-19 PROCEDURE — 3017F COLORECTAL CA SCREEN DOC REV: CPT | Performed by: INTERNAL MEDICINE

## 2025-08-19 PROCEDURE — 1036F TOBACCO NON-USER: CPT | Performed by: INTERNAL MEDICINE

## 2025-08-19 ASSESSMENT — ENCOUNTER SYMPTOMS
SHORTNESS OF BREATH: 0
ABDOMINAL PAIN: 1

## (undated) DEVICE — PAD,NON-ADHERENT,3X8,STERILE,LF,1/PK: Brand: MEDLINE

## (undated) DEVICE — SUTURE VCRL SZ 2-0 L36IN ABSRB UD L36MM CT-1 1/2 CIR J945H

## (undated) DEVICE — CARDINAL HEALTH FLEXIBLE LIGHT HANDLE COVER: Brand: CARDINAL HEALTH

## (undated) DEVICE — SOLUTION IRRIG 1000ML 0.9% SOD CHL USP POUR PLAS BTL

## (undated) DEVICE — WIRE CUTTER, STERILE (WCS144): Brand: CENTURION MEDICAL PRODUCTS CORP

## (undated) DEVICE — CYSTO/BLADDER IRRIGATION SET, REGULATING CLAMP

## (undated) DEVICE — GLOVE SURG SZ 65 CRM LTX FREE POLYISOPRENE POLYMER BEAD ANTI

## (undated) DEVICE — SYRINGE MED 10ML LUERLOCK TIP W/O SFTY DISP

## (undated) DEVICE — 2000CC GUARDIAN II: Brand: GUARDIAN

## (undated) DEVICE — ENDOSCOPIC KIT 1.1+ OP4 CA DE 2 GWN AAMI LEVEL 3

## (undated) DEVICE — Z DISCONTINUED CANNULA NSL ORAL AD FOR CAPNOFLEX CO2 O2 AIRLFE

## (undated) DEVICE — SOLUTION IV 1000ML 0.9% SOD CHL

## (undated) DEVICE — SOLUTION IRRIGATION H2O 0797305] ICU MEDICAL INC]

## (undated) DEVICE — MINOR LITHOTOMY PACK: Brand: MEDLINE INDUSTRIES, INC.

## (undated) DEVICE — YANKAUER,BULB TIP,W/O VENT,RIGID,STERILE: Brand: MEDLINE

## (undated) DEVICE — GLOVE SURG SZ 65 THK91MIL LTX FREE SYN POLYISOPRENE

## (undated) DEVICE — REM POLYHESIVE ADULT PATIENT RETURN ELECTRODE: Brand: VALLEYLAB

## (undated) DEVICE — COVER,MAYO STAND,STERILE: Brand: MEDLINE

## (undated) DEVICE — 3M™ TEGADERM™ TRANSPARENT FILM DRESSING FRAME STYLE, 1626W, 4 IN X 4-3/4 IN (10 CM X 12 CM), 50/CT 4CT/CASE: Brand: 3M™ TEGADERM™

## (undated) DEVICE — LUBE JELLY FOIL PACK 1.4 OZ: Brand: MEDLINE INDUSTRIES, INC.

## (undated) DEVICE — SYRINGE MEDICAL 3ML CLEAR PLASTIC STANDARD NON CONTROL LUERLOCK TIP DISPOSABLE

## (undated) DEVICE — Device

## (undated) DEVICE — GARMENT,MEDLINE,DVT,INT,CALF,LG, GEN2: Brand: MEDLINE

## (undated) DEVICE — GLOVE SURG SZ 7 L12IN FNGR THK79MIL GRN LTX FREE

## (undated) DEVICE — STAPLER SKIN L39MM DIA0.53MM CRWN 5.7MM S STL FIX HD PROX

## (undated) DEVICE — NEEDLE SYRINGE 18GA L1.5IN RED PLAS HUB S STL BLNT FILL W/O

## (undated) DEVICE — APPLIER CLP L9.38IN M LIG TI DISP STR RNG HNDL LIGACLP

## (undated) DEVICE — BUTTON SWITCH PENCIL BLADE ELECTRODE, HOLSTER: Brand: EDGE

## (undated) DEVICE — CANISTER, RIGID, 2000CC: Brand: MEDLINE INDUSTRIES, INC.

## (undated) DEVICE — SUTURE VICRYL SZ 3-0 L18IN ABSRB UD L26MM SH 1/2 CIR J864D

## (undated) DEVICE — CONNECTOR TBNG OD5-7MM O2 END DISP

## (undated) DEVICE — AIRLIFE™ OXYGEN TUBING 7 FEET (2.1 M) CRUSH RESISTANT OXYGEN TUBING, VINYL TIPPED: Brand: AIRLIFE™

## (undated) DEVICE — TUBING, SUCTION, 1/4" X 10', STRAIGHT: Brand: MEDLINE

## (undated) DEVICE — TRAY PREP DRY W/ PREM GLV 2 APPL 6 SPNG 2 UNDPD 1 OVERWRAP

## (undated) DEVICE — CATHETER F BLLN 5CC 16FR 2 W HYDRGEL COAT LESS TRAUM LUB

## (undated) DEVICE — KENDALL RADIOLUCENT FOAM MONITORING ELECTRODE RECTANGULAR SHAPE: Brand: KENDALL

## (undated) DEVICE — LITHOTOMY: Brand: MEDLINE INDUSTRIES, INC.

## (undated) DEVICE — MINOR SPLIT GENERAL: Brand: MEDLINE INDUSTRIES, INC.

## (undated) DEVICE — GAUZE,SPONGE,4"X4",12PLY,WOVEN,NS,LF: Brand: MEDLINE

## (undated) DEVICE — CATHETER,FOLEY,100%SILICONE,16FR,10ML,LF: Brand: MEDLINE

## (undated) DEVICE — DRAPE,LITHOTOMY,STERILE: Brand: MEDLINE

## (undated) DEVICE — NEEDLE HYPO 21GA L1.5IN INTRAMUSCULAR S STL LATCH BVL UP

## (undated) DEVICE — DERMABOND SKIN ADH 0.7ML -- DERMABOND ADVANCED 12/BX

## (undated) DEVICE — SINGLE PORT MANIFOLD: Brand: NEPTUNE 2